# Patient Record
Sex: FEMALE | Race: WHITE | NOT HISPANIC OR LATINO | Employment: UNEMPLOYED | URBAN - METROPOLITAN AREA
[De-identification: names, ages, dates, MRNs, and addresses within clinical notes are randomized per-mention and may not be internally consistent; named-entity substitution may affect disease eponyms.]

---

## 2017-10-25 ENCOUNTER — TRANSCRIBE ORDERS (OUTPATIENT)
Dept: LAB | Facility: HOSPITAL | Age: 37
End: 2017-10-25

## 2017-10-25 ENCOUNTER — LAB (OUTPATIENT)
Dept: LAB | Facility: HOSPITAL | Age: 37
End: 2017-10-25

## 2017-10-25 DIAGNOSIS — N83.202 CYST OF LEFT OVARY: Primary | ICD-10-CM

## 2017-10-25 DIAGNOSIS — N83.202 CYST OF LEFT OVARY: ICD-10-CM

## 2017-10-25 DIAGNOSIS — N83.292 COMPLEX CYST OF LEFT OVARY: ICD-10-CM

## 2017-10-25 LAB
DEPRECATED RDW RBC AUTO: 38.3 FL (ref 37–54)
ERYTHROCYTE [DISTWIDTH] IN BLOOD BY AUTOMATED COUNT: 11.8 % (ref 11.3–14.5)
HCT VFR BLD AUTO: 41.1 % (ref 34.5–44)
HGB BLD-MCNC: 13.9 G/DL (ref 11.5–15.5)
MCH RBC QN AUTO: 30.2 PG (ref 27–31)
MCHC RBC AUTO-ENTMCNC: 33.8 G/DL (ref 32–36)
MCV RBC AUTO: 89.3 FL (ref 80–99)
PLATELET # BLD AUTO: 143 10*3/MM3 (ref 150–450)
PMV BLD AUTO: 10.7 FL (ref 6–12)
RBC # BLD AUTO: 4.6 10*6/MM3 (ref 3.89–5.14)
WBC NRBC COR # BLD: 4.44 10*3/MM3 (ref 3.5–10.8)

## 2017-10-25 PROCEDURE — 36415 COLL VENOUS BLD VENIPUNCTURE: CPT

## 2017-10-25 PROCEDURE — 85027 COMPLETE CBC AUTOMATED: CPT | Performed by: OBSTETRICS & GYNECOLOGY

## 2017-10-27 ENCOUNTER — LAB REQUISITION (OUTPATIENT)
Dept: LAB | Facility: HOSPITAL | Age: 37
End: 2017-10-27

## 2017-10-27 DIAGNOSIS — N83.292 OTHER OVARIAN CYST, LEFT SIDE: ICD-10-CM

## 2017-10-27 PROCEDURE — 88305 TISSUE EXAM BY PATHOLOGIST: CPT | Performed by: OBSTETRICS & GYNECOLOGY

## 2017-10-31 LAB
CYTO UR: NORMAL
LAB AP CASE REPORT: NORMAL
LAB AP CLINICAL INFORMATION: NORMAL
Lab: NORMAL
PATH REPORT.FINAL DX SPEC: NORMAL
PATH REPORT.GROSS SPEC: NORMAL

## 2018-01-25 ENCOUNTER — TRANSCRIBE ORDERS (OUTPATIENT)
Dept: ADMINISTRATIVE | Facility: HOSPITAL | Age: 38
End: 2018-01-25

## 2018-01-25 ENCOUNTER — HOSPITAL ENCOUNTER (OUTPATIENT)
Dept: CT IMAGING | Facility: HOSPITAL | Age: 38
Discharge: HOME | End: 2018-01-25
Admitting: NURSE PRACTITIONER

## 2018-01-25 DIAGNOSIS — R10.30 LOWER ABDOMINAL PAIN: ICD-10-CM

## 2018-01-25 DIAGNOSIS — R10.30 LOWER ABDOMINAL PAIN: Primary | ICD-10-CM

## 2018-01-25 PROCEDURE — 74176 CT ABD & PELVIS W/O CONTRAST: CPT

## 2018-04-17 ENCOUNTER — TRANSCRIBE ORDERS (OUTPATIENT)
Dept: ADMINISTRATIVE | Facility: HOSPITAL | Age: 38
End: 2018-04-17

## 2018-04-17 ENCOUNTER — HOSPITAL ENCOUNTER (OUTPATIENT)
Dept: CT IMAGING | Facility: HOSPITAL | Age: 38
Discharge: HOME | End: 2018-04-17
Attending: FAMILY MEDICINE | Admitting: FAMILY MEDICINE

## 2018-04-17 DIAGNOSIS — R10.31 ABDOMINAL PAIN, RIGHT LOWER QUADRANT: ICD-10-CM

## 2018-04-17 DIAGNOSIS — R10.31 ABDOMINAL PAIN, RIGHT LOWER QUADRANT: Primary | ICD-10-CM

## 2018-04-17 PROCEDURE — 74176 CT ABD & PELVIS W/O CONTRAST: CPT

## 2018-07-03 ENCOUNTER — TRANSCRIBE ORDERS (OUTPATIENT)
Dept: ADMINISTRATIVE | Facility: HOSPITAL | Age: 38
End: 2018-07-03

## 2018-07-03 DIAGNOSIS — I51.7 CARDIOMEGALY: Primary | ICD-10-CM

## 2018-07-05 ENCOUNTER — HOSPITAL ENCOUNTER (OUTPATIENT)
Dept: CARDIOLOGY | Facility: HOSPITAL | Age: 38
Discharge: HOME | End: 2018-07-05
Attending: FAMILY MEDICINE | Admitting: FAMILY MEDICINE

## 2018-07-05 VITALS
WEIGHT: 164 LBS | HEIGHT: 64 IN | BODY MASS INDEX: 28 KG/M2 | SYSTOLIC BLOOD PRESSURE: 107 MMHG | DIASTOLIC BLOOD PRESSURE: 69 MMHG

## 2018-07-05 DIAGNOSIS — I51.7 CARDIOMEGALY: ICD-10-CM

## 2018-07-05 LAB
BH CV ECHO MEAS - AO ROOT AREA (BSA CORRECTED): 1.6
BH CV ECHO MEAS - AO ROOT AREA: 6.6 CM^2
BH CV ECHO MEAS - AO ROOT DIAM: 2.9 CM
BH CV ECHO MEAS - BSA(HAYCOCK): 1.9 M^2
BH CV ECHO MEAS - BSA: 1.8 M^2
BH CV ECHO MEAS - BZI_BMI: 28.2 KILOGRAMS/M^2
BH CV ECHO MEAS - BZI_METRIC_HEIGHT: 162.6 CM
BH CV ECHO MEAS - BZI_METRIC_WEIGHT: 74.4 KG
BH CV ECHO MEAS - CONTRAST EF (2CH): 70.1 ML/M^2
BH CV ECHO MEAS - CONTRAST EF 4CH: 62.8 ML/M^2
BH CV ECHO MEAS - EDV(CUBED): 66.4 ML
BH CV ECHO MEAS - EDV(MOD-SP2): 137 ML
BH CV ECHO MEAS - EDV(MOD-SP4): 86 ML
BH CV ECHO MEAS - EDV(TEICH): 72.1 ML
BH CV ECHO MEAS - EF(CUBED): 69.7 %
BH CV ECHO MEAS - EF(MOD-BP): 67 %
BH CV ECHO MEAS - EF(MOD-SP2): 70.1 %
BH CV ECHO MEAS - EF(MOD-SP4): 62.8 %
BH CV ECHO MEAS - EF(TEICH): 61.8 %
BH CV ECHO MEAS - ESV(CUBED): 20.1 ML
BH CV ECHO MEAS - ESV(MOD-SP2): 41 ML
BH CV ECHO MEAS - ESV(MOD-SP4): 32 ML
BH CV ECHO MEAS - ESV(TEICH): 27.5 ML
BH CV ECHO MEAS - FS: 32.8 %
BH CV ECHO MEAS - IVS/LVPW: 0.95
BH CV ECHO MEAS - IVSD: 0.69 CM
BH CV ECHO MEAS - LA DIMENSION: 3 CM
BH CV ECHO MEAS - LA/AO: 1
BH CV ECHO MEAS - LAT PEAK E' VEL: 12.7 CM/SEC
BH CV ECHO MEAS - LV DIASTOLIC VOL/BSA (35-75): 47.8 ML/M^2
BH CV ECHO MEAS - LV IVRT: 0.09 SEC
BH CV ECHO MEAS - LV MASS(C)D: 80.8 GRAMS
BH CV ECHO MEAS - LV MASS(C)DI: 45 GRAMS/M^2
BH CV ECHO MEAS - LV SYSTOLIC VOL/BSA (12-30): 17.8 ML/M^2
BH CV ECHO MEAS - LVIDD: 4.1 CM
BH CV ECHO MEAS - LVIDS: 2.7 CM
BH CV ECHO MEAS - LVLD AP2: 7.8 CM
BH CV ECHO MEAS - LVLD AP4: 7.2 CM
BH CV ECHO MEAS - LVLS AP2: 5.9 CM
BH CV ECHO MEAS - LVLS AP4: 5.5 CM
BH CV ECHO MEAS - LVOT AREA (M): 3.1 CM^2
BH CV ECHO MEAS - LVOT AREA: 3.1 CM^2
BH CV ECHO MEAS - LVOT DIAM: 2 CM
BH CV ECHO MEAS - LVPWD: 0.73 CM
BH CV ECHO MEAS - MED PEAK E' VEL: 9.87 CM/SEC
BH CV ECHO MEAS - MV A MAX VEL: 60.5 CM/SEC
BH CV ECHO MEAS - MV DEC TIME: 0.19 SEC
BH CV ECHO MEAS - MV E MAX VEL: 80.8 CM/SEC
BH CV ECHO MEAS - MV E/A: 1.3
BH CV ECHO MEAS - PA ACC SLOPE: 766 CM/SEC^2
BH CV ECHO MEAS - PA ACC TIME: 0.15 SEC
BH CV ECHO MEAS - PA PR(ACCEL): 11.1 MMHG
BH CV ECHO MEAS - PULM A REVS VEL: 48.7 CM/SEC
BH CV ECHO MEAS - PULM DIAS VEL: 50 CM/SEC
BH CV ECHO MEAS - PULM S/D: 1.4
BH CV ECHO MEAS - PULM SYS VEL: 71.6 CM/SEC
BH CV ECHO MEAS - RAP SYSTOLE: 3 MMHG
BH CV ECHO MEAS - RVSP: 24 MMHG
BH CV ECHO MEAS - SI(CUBED): 25.8 ML/M^2
BH CV ECHO MEAS - SI(MOD-SP2): 53.4 ML/M^2
BH CV ECHO MEAS - SI(MOD-SP4): 30 ML/M^2
BH CV ECHO MEAS - SI(TEICH): 24.8 ML/M^2
BH CV ECHO MEAS - SV(CUBED): 46.3 ML
BH CV ECHO MEAS - SV(MOD-SP2): 96 ML
BH CV ECHO MEAS - SV(MOD-SP4): 54 ML
BH CV ECHO MEAS - SV(TEICH): 44.6 ML
BH CV ECHO MEAS - TAPSE (>1.6): 2.6 CM2
BH CV ECHO MEAS - TR MAX V: 21 MMHG
BH CV ECHO MEAS - TR MAX VEL: 227 CM/SEC
BH CV ECHO MEASUREMENTS AVERAGE E/E' RATIO: 7.16
BH CV VAS BP LEFT ARM: NORMAL MMHG
BH CV XLRA - RV BASE: 4 CM
BH CV XLRA - RV LENGTH: 6.8 CM
BH CV XLRA - RV MID: 3.3 CM
BH CV XLRA - TDI S': 16.2 CM/SEC
LEFT ATRIUM VOLUME INDEX: 22.8 ML/M2
LV EF 2D ECHO EST: 65 %

## 2018-07-05 PROCEDURE — 93306 TTE W/DOPPLER COMPLETE: CPT | Performed by: INTERNAL MEDICINE

## 2018-07-05 PROCEDURE — 93306 TTE W/DOPPLER COMPLETE: CPT

## 2018-07-17 NOTE — PROGRESS NOTES
Subjective:     Encounter Date:07/20/2018    Patient ID: Odalys Alaniz is a 37 y.o.  white female from [place], [place], stay at home mom.    PHYSICIAN: Leigha Lopes MD    Chief Complaint:   Chief Complaint   Patient presents with   • Chest Pain   • Irregular Heart Beat   • Shortness of Breath     Problem List:  1. Palpitations with associated chest discomfort/shortness of breath  A. CTA chest 7/2/18: Negative for PE, no acute process  B. Echocardiogram 7/5/18: LVEF 0.65, trace MR, trace TR  C. CCS class I chest discomfort/NYHA class II dyspnea symptoms with acceptable EKG.   2. History of syncope  3. History of migraines  4. Surgical history:  A. [place] teeth extraction  B. Ovarian cyst removal 2010, 2017    No Known Allergies    Current Outpatient Prescriptions:   •  levonorgestrel (MIRENA) 20 MCG/24HR IUD, 1 each by Intrauterine route 1 (One) Time., Disp: , Rfl:     History of Present Illness  The patient is a 37-year-old white female who presents to establish care for chest pain, shortness of breath, palpitations, and 5 previous syncopal episodes.  She states that she was very active in the past by doing high-intensity training at the gym several times a week, but feels that she cannot do these activities anymore.  She states that she gets very winded going up even a flight of stairs. She had bronchitis last month followed by some antibiotics ×2.  Then, she underwent a CTA of the chest, which was negative for active disease.  She has also had an echocardiogram, which was acceptable.  She has never had any prior stress test, heart catheterizations, MIs, diabetes mellitus, hypertension, hyperlipidemia, thyroid dysfunction, cardiomegaly, murmurs, arrhythmias, PAF, orthopnea, PND, COPD, asthma, anemia, GERD, melena, valvular dysfunction, CVAs, TIAs, seizures, DVTs, PEs, edema, heart failure, or claudication.  She states that she can feel these episodes coming on, and her chest discomfort feels like  someone pushing on her chest, with shortness of breath, radiation occasionally to her neck, and dizziness, presyncope.  She has had 5 total syncopal episodes always occurring after her chest pain and palpitations developed as a prodrome and not associated with any significant injury or prolonged unconsciousness, urinary/fecal incontinence, or seizure activity. She has never worn a heart monitor in the past.  Rest does not necessarily make these symptoms go away, and she states that these sensations can last all day at times.  She is scheduled to go to [place] 8/1/18-8/13/18 and is a little anxious that something will happen with her heart while she is there. She denies any prior PIH, preeclampsia, or gestational diabetes in her only pregnancy. She denies having ever smoked and she denies illicit drugs or significant alcohol use. Patient otherwise denies chest pain, shortness of breath, PND, edema, palpitations, syncope or presyncope at this time.    Cardiovascular Disease Risk Factors  none    Social History     Social History   • Marital status:      Spouse name: N/A   • Number of children: 1   • Years of education: N/A     Occupational History   • Not on file.     Social History Main Topics   • Smoking status: Not on file   • Smokeless tobacco: Not on file   • Alcohol use Not on file   • Drug use: Unknown   • Sexual activity: Not on file     Other Topics Concern   • Not on file     Social History Narrative   • No narrative on file   · Mother and father: Hypertension  · No siblings  · Daughter: Healthy    Review of Systems   Constitution: Positive for malaise/fatigue.   Eyes: Negative.    Cardiovascular: Positive for chest pain, near-syncope, palpitations and syncope. Negative for claudication, dyspnea on exertion, irregular heartbeat, leg swelling, orthopnea and paroxysmal nocturnal dyspnea.   Respiratory: Positive for shortness of breath.    Endocrine: Negative.    Hematologic/Lymphatic: Negative.    Skin:  "Negative.    Musculoskeletal: Positive for neck pain and stiffness.   Gastrointestinal: Negative for change in bowel habit, heartburn and melena.   Genitourinary: Positive for frequency.   Neurological: Positive for excessive daytime sleepiness and headaches. Negative for focal weakness and seizures.   Allergic/Immunologic: Positive for environmental allergies.      Obtained and negative except as outlined in problem list and HPI.      ECG 12 Lead  Date/Time: 7/20/2018 12:12 PM  Performed by: TESSA MILAN  Authorized by: TESSA MILAN   Rhythm comments: Normal sinus rhythm with sinus arrhythmia, right atrial enlargement, cannot rule out anterior infarct, age undetermined, abnormal ECG, 88 bpm, QRS 96 ms,  ms,  ms                 Objective:       Vitals:    07/20/18 1041 07/20/18 1042 07/20/18 1043 07/20/18 1047   BP: 98/62 104/58 110/60 104/58   BP Location: Right arm Right arm Left arm Left arm   Patient Position: Sitting Standing Sitting Standing   Pulse: 88 94 88 92   Weight: 71.1 kg (156 lb 12.8 oz)      Height: 162.6 cm (64\")        Body mass index is 26.91 kg/m².     Physical Exam   Constitutional: She is oriented to person, place, and time. She appears well-developed and well-nourished.   HENT:   Mouth/Throat: Mucous membranes are normal.   Eyes:   Fundoscopic exam:       The right eye shows no AV nicking, no exudate and no hemorrhage.        The left eye shows no AV nicking, no exudate and no hemorrhage.   Neck: No JVD present. Carotid bruit is not present. No thyromegaly present.   Cardiovascular: Regular rhythm, S1 normal and S2 normal.  Exam reveals no gallop, no S3 and no friction rub.    Murmur heard.   Medium-pitched systolic murmur is present with a grade of 1/6  at the lower left sternal border  Pulses:       Dorsalis pedis pulses are 2+ on the right side, and 2+ on the left side.        Posterior tibial pulses are 2+ on the right side, and 2+ on the left side.   Pulmonary/Chest: " Effort normal and breath sounds normal. She has no wheezes. She has no rhonchi. She has no rales.   Abdominal: Soft. She exhibits no mass. There is no hepatosplenomegaly. There is no tenderness. There is no guarding.   Bowel sounds audible x4   Musculoskeletal: Normal range of motion. She exhibits no edema.   Lymphadenopathy:     She has no cervical adenopathy.   Neurological: She is alert and oriented to person, place, and time.   Skin: Skin is warm, dry and intact. No rash noted.   Psychiatric: Her mood appears anxious.   Vitals reviewed.    Lab Review:   Results for orders placed or performed during the hospital encounter of 07/05/18   Adult Transthoracic Echo Complete W/ Cont if Necessary Per Protocol   Result Value Ref Range    BSA 1.8 m^2    IVSd 0.69 cm    LVIDd 4.1 cm    LVIDs 2.7 cm    LVPWd 0.73 cm    IVS/LVPW 0.95     FS 32.8 %    EDV(Teich) 72.1 ml    ESV(Teich) 27.5 ml    EF(Teich) 61.8 %    EDV(cubed) 66.4 ml    ESV(cubed) 20.1 ml    EF(cubed) 69.7 %    LV mass(C)d 80.8 grams    LV mass(C)dI 45.0 grams/m^2    SV(Teich) 44.6 ml    SI(Teich) 24.8 ml/m^2    SV(cubed) 46.3 ml    SI(cubed) 25.8 ml/m^2    Ao root diam 2.9 cm    Ao root area 6.6 cm^2    LA dimension 3.0 cm    LA/Ao 1.0     LVOT diam 2.0 cm    LVOT area 3.1 cm^2    LVOT area(traced) 3.1 cm^2    LVLd ap4 7.2 cm    EDV(MOD-sp4) 86.0 ml    LVLs ap4 5.5 cm    ESV(MOD-sp4) 32.0 ml    EF(MOD-sp4) 62.8 %    LVLd ap2 7.8 cm    EDV(MOD-sp2) 137.0 ml    LVLs ap2 5.9 cm    ESV(MOD-sp2) 41.0 ml    EF(MOD-sp2) 70.1 %    SV(MOD-sp4) 54.0 ml    SI(MOD-sp4) 30.0 ml/m^2    SV(MOD-sp2) 96.0 ml    SI(MOD-sp2) 53.4 ml/m^2    Ao root area (BSA corrected) 1.6     Ao root area (BSA corrected) 70.1 ml/m^2    CONTRAST EF 4CH 62.8 ml/m^2    LV Diastolic corrected for BSA 47.8 ml/m^2    LV Systolic corrected for BSA 17.8 ml/m^2    MV E max gorge 80.8 cm/sec    MV A max gorge 60.5 cm/sec    MV E/A 1.3     LV IVRT 0.09 sec    MV dec time 0.19 sec    PA acc slope 766.0  cm/sec^2    PA acc time 0.15 sec    TR max joselito 227 cm/sec    PA pr(Accel) 11.1 mmHg    Pulm Sys Joselito 71.6 cm/sec    Pulm Ray Joselito 50.0 cm/sec    Pulm S/D 1.4     Pulm A Revs Joselito 48.7 cm/sec     CV ECHO KING - BZI_BMI 28.2 kilograms/m^2     CV ECHO KING - BSA(HAYCOCK) 1.9 m^2     CV ECHO KING - BZI_METRIC_WEIGHT 74.4 kg     CV ECHO KING - BZI_METRIC_HEIGHT 162.6 cm     CV VAS BP LEFT /72 mmHg    TDI S' 16.20 cm/sec    RV Base 4.00 cm    RV Length 6.80 cm    RV Mid 3.30 cm    LA Volume Index 22.8 mL/m2    Echo EF Estimated 65 %    Avg E/e' ratio 7.16     EF(MOD-bp) 67 %    Lat Peak E' Joselito 12.7 cm/sec    Med Peak E' Joselito 9.87 cm/sec    RAP systole 3 mmHg    RVSP(TR) 24 mmHg    TR max grad 21 mmHg    TAPSE (>1.6) 2.60 cm2     (07/02/2018):   · CK-MB 1  · Troponin 0.05   · Myoglobin 36    CBC (07/02/2018):   · WBC 8.6  · RBC 5.06  · Hemoglobin 16.3  · Hematocrit 46.1  · MCV 91  · MCH 32.2  · MCHC 35.4  · RDW 12  · Platelets 159  · MPV 10.4  · Neutrophils 58.3  · Lymphocytes 35.1  · Mixed 6.6    · CTA chest (07/02/2018): Negative exam for PE, no acute process    · ECG (07/02/2018): Sinus rhythm, RSR (V1) nondiagnostic, combined atrial enlargement, 86 bpm,  ms,  ms, QRS 90 ms    Assessment:   Patient with chest discomfort and SOB with palpitations and past history of syncope. Both her echocardiogram and CTA of the chest were acceptable. We will order a tilt table test to rule out neurocardiogenic syncope. We will order a treadmill stress test with continuous oximetry to assess her chest discomfort and SOB. We will place a MCOT to assess her palpitations to rule out arrhythmias, PAF, or pauses. We will order a TSH to assess for thyroid dysfunction and get a BNP since we do not have any values at baseline.      Diagnosis Plan   1. Palpitations  TSH    [facility] Out[facility] Telemetry    Tilt Table    Treadmill Stress Test    ECG 12 Lead   2. Syncope, unspecified syncope type  TSH    Mobile  Cardiac Outpatient Telemetry    Tilt Table    Treadmill Stress Test   3. Chest pain, unspecified type  TSH    BNP    Treadmill Stress Test    ECG 12 Lead     Plan:     1. Patient to continue current medications and close follow up with the above providers.  2. Tentative cardiology follow up in 4 weeks or patient may return sooner PRN.  3. Schedule and undergo tilt table test.  4. Schedule and undergo GXT stress test with continuous oximetry.   5. TSH and BNP ordered.  6. MCOT.   7. 1(800)card issued.     Scribed for Butch Canales MD by Fabiola Reyna, JG. 7/20/2018  12:55 PM     I, Butch Canales MD, [facility], personally performed the services described in this documentation as scribed by the above named individual in my presence, and it is both accurate and complete. At 1:02 PM on 07/20/2018

## 2018-07-20 ENCOUNTER — LAB (OUTPATIENT)
Dept: LAB | Facility: HOSPITAL | Age: 38
End: 2018-07-20

## 2018-07-20 ENCOUNTER — CONSULT (OUTPATIENT)
Dept: CARDIOLOGY | Facility: CLINIC | Age: 38
End: 2018-07-20

## 2018-07-20 VITALS
BODY MASS INDEX: 26.77 KG/M2 | DIASTOLIC BLOOD PRESSURE: 58 MMHG | HEIGHT: 64 IN | HEART RATE: 92 BPM | WEIGHT: 156.8 LBS | SYSTOLIC BLOOD PRESSURE: 104 MMHG

## 2018-07-20 DIAGNOSIS — R07.9 CHEST PAIN, UNSPECIFIED TYPE: ICD-10-CM

## 2018-07-20 DIAGNOSIS — R55 SYNCOPE, UNSPECIFIED SYNCOPE TYPE: ICD-10-CM

## 2018-07-20 DIAGNOSIS — R00.2 PALPITATIONS: ICD-10-CM

## 2018-07-20 DIAGNOSIS — R06.02 SOB (SHORTNESS OF BREATH): Primary | ICD-10-CM

## 2018-07-20 DIAGNOSIS — R00.2 PALPITATIONS: Primary | ICD-10-CM

## 2018-07-20 LAB
ANION GAP SERPL CALCULATED.3IONS-SCNC: 5 MMOL/L (ref 3–11)
BUN BLD-MCNC: 15 MG/DL (ref 9–23)
BUN/CREAT SERPL: 21.1 (ref 7–25)
CALCIUM SPEC-SCNC: 9 MG/DL (ref 8.7–10.4)
CHLORIDE SERPL-SCNC: 105 MMOL/L (ref 99–109)
CO2 SERPL-SCNC: 28 MMOL/L (ref 20–31)
CREAT BLD-MCNC: 0.71 MG/DL (ref 0.6–1.3)
GFR SERPL CREATININE-BSD FRML MDRD: 94 ML/MIN/1.73
GLUCOSE BLD-MCNC: 103 MG/DL (ref 70–100)
POTASSIUM BLD-SCNC: 4 MMOL/L (ref 3.5–5.5)
SODIUM BLD-SCNC: 138 MMOL/L (ref 132–146)
TSH SERPL DL<=0.05 MIU/L-ACNC: 2.41 MIU/ML (ref 0.35–5.35)

## 2018-07-20 PROCEDURE — 93000 ELECTROCARDIOGRAM COMPLETE: CPT | Performed by: INTERNAL MEDICINE

## 2018-07-20 PROCEDURE — 84443 ASSAY THYROID STIM HORMONE: CPT

## 2018-07-20 PROCEDURE — 80048 BASIC METABOLIC PNL TOTAL CA: CPT

## 2018-07-20 PROCEDURE — 36415 COLL VENOUS BLD VENIPUNCTURE: CPT

## 2018-07-20 PROCEDURE — 99204 OFFICE O/P NEW MOD 45 MIN: CPT | Performed by: INTERNAL MEDICINE

## 2018-07-25 ENCOUNTER — RESULTS ENCOUNTER (OUTPATIENT)
Dept: CARDIOLOGY | Facility: CLINIC | Age: 38
End: 2018-07-25

## 2018-07-25 DIAGNOSIS — R06.02 SOB (SHORTNESS OF BREATH): ICD-10-CM

## 2018-07-30 ENCOUNTER — HOSPITAL ENCOUNTER (OUTPATIENT)
Dept: CARDIOLOGY | Facility: HOSPITAL | Age: 38
Discharge: HOME | End: 2018-07-30
Attending: INTERNAL MEDICINE | Admitting: INTERNAL MEDICINE

## 2018-07-30 VITALS
BODY MASS INDEX: 26.46 KG/M2 | SYSTOLIC BLOOD PRESSURE: 100 MMHG | HEIGHT: 64 IN | HEART RATE: 75 BPM | WEIGHT: 155 LBS | DIASTOLIC BLOOD PRESSURE: 68 MMHG

## 2018-07-30 DIAGNOSIS — R07.9 CHEST PAIN, UNSPECIFIED TYPE: ICD-10-CM

## 2018-07-30 DIAGNOSIS — R00.2 PALPITATIONS: ICD-10-CM

## 2018-07-30 DIAGNOSIS — R55 SYNCOPE, UNSPECIFIED SYNCOPE TYPE: ICD-10-CM

## 2018-07-30 LAB
BH CV STRESS BP STAGE 1: NORMAL
BH CV STRESS BP STAGE 2: NORMAL
BH CV STRESS BP STAGE 3: NORMAL
BH CV STRESS DURATION MIN STAGE 1: 3
BH CV STRESS DURATION MIN STAGE 2: 3
BH CV STRESS DURATION MIN STAGE 3: 3
BH CV STRESS DURATION MIN STAGE 4: 2
BH CV STRESS DURATION SEC STAGE 1: 0
BH CV STRESS DURATION SEC STAGE 2: 0
BH CV STRESS DURATION SEC STAGE 3: 0
BH CV STRESS DURATION SEC STAGE 4: 1
BH CV STRESS GRADE STAGE 1: 10
BH CV STRESS GRADE STAGE 2: 12
BH CV STRESS GRADE STAGE 3: 14
BH CV STRESS GRADE STAGE 4: 16
BH CV STRESS HR STAGE 1: 111
BH CV STRESS HR STAGE 2: 118
BH CV STRESS HR STAGE 3: 144
BH CV STRESS HR STAGE 4: 179
BH CV STRESS METS STAGE 1: 5
BH CV STRESS METS STAGE 2: 7.5
BH CV STRESS METS STAGE 3: 10
BH CV STRESS METS STAGE 4: 13.5
BH CV STRESS O2 STAGE 1: 100
BH CV STRESS O2 STAGE 2: 100
BH CV STRESS O2 STAGE 3: 99
BH CV STRESS O2 STAGE 4: 98
BH CV STRESS PROTOCOL 1: NORMAL
BH CV STRESS RECOVERY BP: NORMAL MMHG
BH CV STRESS RECOVERY HR: 99 BPM
BH CV STRESS SPEED STAGE 1: 1.7
BH CV STRESS SPEED STAGE 2: 2.5
BH CV STRESS SPEED STAGE 3: 3.4
BH CV STRESS SPEED STAGE 4: 4.2
BH CV STRESS STAGE 1: 1
BH CV STRESS STAGE 2: 2
BH CV STRESS STAGE 3: 3
BH CV STRESS STAGE 4: 4
MAXIMAL PREDICTED HEART RATE: 185 BPM
PERCENT MAX PREDICTED HR: 96.76 %
STRESS BASELINE BP: NORMAL MMHG
STRESS BASELINE HR: 78 BPM
STRESS O2 SAT REST: 100 %
STRESS PERCENT HR: 114 %
STRESS POST ESTIMATED WORKLOAD: 13.4 METS
STRESS POST EXERCISE DUR MIN: 11 MIN
STRESS POST EXERCISE DUR SEC: 1 SEC
STRESS POST O2 SAT PEAK: 98 %
STRESS POST PEAK BP: NORMAL MMHG
STRESS POST PEAK HR: 179 BPM
STRESS TARGET HR: 157 BPM

## 2018-07-30 PROCEDURE — 93018 CV STRESS TEST I&R ONLY: CPT | Performed by: INTERNAL MEDICINE

## 2018-07-30 PROCEDURE — 93017 CV STRESS TEST TRACING ONLY: CPT

## 2018-08-22 ENCOUNTER — APPOINTMENT (OUTPATIENT)
Dept: CARDIOLOGY | Facility: HOSPITAL | Age: 38
End: 2018-08-22
Attending: INTERNAL MEDICINE

## 2018-08-27 ENCOUNTER — OFFICE VISIT (OUTPATIENT)
Dept: CARDIOLOGY | Facility: CLINIC | Age: 38
End: 2018-08-27

## 2018-08-27 VITALS
WEIGHT: 155 LBS | HEART RATE: 86 BPM | DIASTOLIC BLOOD PRESSURE: 62 MMHG | BODY MASS INDEX: 26.46 KG/M2 | HEIGHT: 64 IN | SYSTOLIC BLOOD PRESSURE: 98 MMHG

## 2018-08-27 DIAGNOSIS — R55 SYNCOPE, UNSPECIFIED SYNCOPE TYPE: ICD-10-CM

## 2018-08-27 DIAGNOSIS — R00.2 PALPITATIONS: Primary | ICD-10-CM

## 2018-08-27 DIAGNOSIS — R07.2 PRECORDIAL PAIN: ICD-10-CM

## 2018-08-27 PROCEDURE — 99214 OFFICE O/P EST MOD 30 MIN: CPT | Performed by: INTERNAL MEDICINE

## 2018-08-27 NOTE — PROGRESS NOTES
Subjective:     Encounter Date:08/27/2018    Patient ID: Odalys Alaniz is a 38 y.o.  white female from [place], [place], stay at home mom.     PHYSICIAN: Leigha Lopes MD    Chief Complaint:   Chief Complaint   Patient presents with   • Palpitations     Problem List:  1. Palpitations with associated chest discomfort/shortness of breath  A. CTA chest 7/2/18: Negative for PE, no acute process  B. Echocardiogram 7/5/18: LVEF 0.65, trace MR, trace TR  C. CCS class I chest discomfort/NYHA class II dyspnea symptoms with acceptable EKG with acceptable probably negative exercise stress test with borderline ischemic EKG changes and nonlimiting chest discomfort suggestive of low probability for significant focal obstructive coronary artery disease following exercise to 111% predicted excess capacity and 97% predicted maximum heart rate, 07/30/2018  D. Residual class I symptoms with event recorder pending, August 2018  2. History of syncope  3. History of migraines  4. Surgical history:  A. [place] teeth extraction  B. Ovarian cyst removal 2010, 2017    No Known Allergies      Current Outpatient Prescriptions:   •  levonorgestrel (MIRENA) 20 MCG/24HR IUD, 1 each by Intrauterine route 1 (One) Time., Disp: , Rfl:     HISTORY OF PRESENT ILLNESS: Patient returns for scheduled 1-month followup.  She was to undergo a tilt table test in the interim; however, this test has not been performed.  She notes this was scheduled, but her  was going to be traveling during that time, so she canceled it.  She is advised today that her stress test looked good and that there should be no limitations with her activity.  She has worn a CardioNet monitor and turned it in approximately a week ago; however, we have not received those results.  The patient will be sent a letter with those results when available.  She plans to work out with a  for 60 minutes, 3 times a week, and she is advised that she should be fine with  "that activity.  She says that she wore the CardioNet \"pretty much the whole time,\" except for while she was in [place] on vacation since she would be getting in the ocean.  She says that she tried paddleboarding for the first time while there; they stayed on [place] property there in the [facility] area.  She notes that she actually started feeling better while she was on vacation.  Patient otherwise denies chest pain, shortness of breath, PND, edema, palpitations, syncope or presyncope at this time.        Review of Systems   Hematologic/Lymphatic: Positive for adenopathy.   Musculoskeletal: Positive for neck pain and stiffness.   Genitourinary: Positive for bladder incontinence and frequency.   Neurological: Positive for numbness.   Allergic/Immunologic: Positive for environmental allergies.      Obtained and otherwise negative except as outlined in problem list and HPI.    Procedures       Objective:       Vitals:    08/27/18 1007 08/27/18 1011   BP: 92/60 98/62   BP Location: Left arm Left arm   Patient Position: Sitting Standing   Pulse: 82 86   Weight: 70.3 kg (155 lb)    Height: 162.6 cm (64\")      Body mass index is 26.61 kg/m².   Last weight:  156 lbs.    Physical Exam   Constitutional: She is oriented to person, place, and time. She appears well-developed and well-nourished.   Neck: No JVD present. Carotid bruit is not present. No thyromegaly present.   Cardiovascular: Regular rhythm, S1 normal, S2 normal and normal heart sounds.  Exam reveals no gallop, no S3 and no friction rub.    No murmur heard.  Pulses:       Dorsalis pedis pulses are 2+ on the right side, and 2+ on the left side.        Posterior tibial pulses are 2+ on the right side, and 2+ on the left side.   Pulmonary/Chest: Effort normal and breath sounds normal. She has no wheezes. She has no rhonchi. She has no rales.   Abdominal: Soft. She exhibits no mass. There is no hepatosplenomegaly. There is no tenderness. There is no guarding.   Bowel sounds " audible x4   Musculoskeletal: Normal range of motion. She exhibits no edema.   Lymphadenopathy:     She has no cervical adenopathy.   Neurological: She is alert and oriented to person, place, and time.   Skin: Skin is warm, dry and intact. No rash noted.   Vitals reviewed.        Lab Review:   Lab Results   Component Value Date    GLUCOSE 103 (H) 07/20/2018    BUN 15 07/20/2018    CREATININE 0.71 07/20/2018    EGFRIFNONA 94 07/20/2018    BCR 21.1 07/20/2018    CO2 28.0 07/20/2018    CALCIUM 9.0 07/20/2018       Lab Results   Component Value Date    WBC 4.44 10/25/2017    HGB 13.9 10/25/2017    HCT 41.1 10/25/2017    MCV 89.3 10/25/2017     (L) 10/25/2017       Lab Results   Component Value Date    TSH 2.410 07/20/2018     Stress test, 07/30/2018:    · Patient noted chest pressure at peak exercise; this resolved 4 minutes into recovery.  · Expected exercise duration = 9:40 minutes; actual = 11:01 minutes; BOGDAN = ( - 11).  · THR of 157 bpm achieved at 10:00 minutes; normal rest and exercise room air oximetry readings ( percent).  · The patient reported shortness of breath and peak exercise anginal type chest discomfort during the stress test.  · No significant ST or T wave changes noted; no ectopy.  · Acceptable probably negative exercise stress test with borderline ischemic EKG changes and nonlimiting chest discomfort suggestive of low probability for significant focal obstructive coronary artery disease following exercise to 111% predicted excess capacity and 97% predicted maximum heart rate.      Assessment:   Overall continued acceptable course with no interim cardiopulmonary complaints with good functional status. We will defer additional diagnostic or therapeutic intervention from a cardiac perspective at this time.  We will review the results of her CardioNet monitor and forward the patient a letter with those results.  We will defer tilt table testing at this time since the patient is asymptomatic.   I do feel she can exercise without restriction and do weight training under the supervision of her .       Diagnosis Plan   1. Palpitations  Will review event recorder and final report when available   2. Syncope, unspecified syncope type  No recurrence; defer tilt table test at this time   3. Precordial pain  Acceptable exercise stress test; would pursue continued physical activity and training as tolerated          Plan:         1. Patient to continue current medications and close follow up with the above providers.  2. Tentative cardiology follow up in February 2019, or patient may return sooner PRN.  3. We will write a release for her to initiate exercise training under the direction of her .    Transcribed by Priya Valles for Dr. Butch Canales at 10:19 AM on 08/27/2018    I, Butch Canales MD, [facility], personally performed the services described in this documentation as scribed by the above named individual in my presence, and it is both accurate and complete. At 10:21 AM on 08/27/2018

## 2019-03-01 ENCOUNTER — OFFICE VISIT (OUTPATIENT)
Dept: CARDIOLOGY | Facility: CLINIC | Age: 39
End: 2019-03-01

## 2019-03-01 VITALS
BODY MASS INDEX: 28.34 KG/M2 | WEIGHT: 166 LBS | HEIGHT: 64 IN | DIASTOLIC BLOOD PRESSURE: 80 MMHG | SYSTOLIC BLOOD PRESSURE: 104 MMHG | HEART RATE: 90 BPM | OXYGEN SATURATION: 99 %

## 2019-03-01 DIAGNOSIS — R00.2 PALPITATIONS: Primary | ICD-10-CM

## 2019-03-01 DIAGNOSIS — R55 SYNCOPE, UNSPECIFIED SYNCOPE TYPE: ICD-10-CM

## 2019-03-01 DIAGNOSIS — R07.2 PRECORDIAL PAIN: ICD-10-CM

## 2019-03-01 PROCEDURE — 99213 OFFICE O/P EST LOW 20 MIN: CPT | Performed by: NURSE PRACTITIONER

## 2021-01-29 ENCOUNTER — TRANSCRIBE ORDERS (OUTPATIENT)
Dept: ADMINISTRATIVE | Facility: HOSPITAL | Age: 41
End: 2021-01-29

## 2021-01-29 ENCOUNTER — HOSPITAL ENCOUNTER (OUTPATIENT)
Dept: GENERAL RADIOLOGY | Facility: HOSPITAL | Age: 41
Discharge: HOME | End: 2021-01-29
Admitting: INTERNAL MEDICINE

## 2021-01-29 DIAGNOSIS — M25.521 RIGHT ELBOW PAIN: Primary | ICD-10-CM

## 2021-01-29 PROCEDURE — 72050 X-RAY EXAM NECK SPINE 4/5VWS: CPT

## 2021-01-29 PROCEDURE — 73070 X-RAY EXAM OF ELBOW: CPT

## 2021-02-01 ENCOUNTER — TRANSCRIBE ORDERS (OUTPATIENT)
Dept: ADMINISTRATIVE | Facility: HOSPITAL | Age: 41
End: 2021-02-01

## 2021-02-01 DIAGNOSIS — G56.21 ULNAR NEUROPATHY AT ELBOW, RIGHT: Primary | ICD-10-CM

## 2021-03-02 ENCOUNTER — APPOINTMENT (OUTPATIENT)
Dept: NEUROLOGY | Facility: HOSPITAL | Age: 41
End: 2021-03-02

## 2021-08-30 ENCOUNTER — TRANSCRIBE ORDERS (OUTPATIENT)
Dept: ADMINISTRATIVE | Facility: HOSPITAL | Age: 41
End: 2021-08-30

## 2021-08-30 DIAGNOSIS — G43.909 MIGRAINE WITHOUT STATUS MIGRAINOSUS, NOT INTRACTABLE, UNSPECIFIED MIGRAINE TYPE: Primary | ICD-10-CM

## 2021-09-07 ENCOUNTER — TRANSCRIBE ORDERS (OUTPATIENT)
Dept: ADMINISTRATIVE | Facility: HOSPITAL | Age: 41
End: 2021-09-07

## 2021-09-17 ENCOUNTER — OFFICE VISIT (OUTPATIENT)
Dept: NEUROLOGY | Facility: CLINIC | Age: 41
End: 2021-09-17

## 2021-09-17 ENCOUNTER — LAB (OUTPATIENT)
Dept: LAB | Facility: HOSPITAL | Age: 41
End: 2021-09-17

## 2021-09-17 VITALS
SYSTOLIC BLOOD PRESSURE: 118 MMHG | BODY MASS INDEX: 30.83 KG/M2 | HEIGHT: 63 IN | HEART RATE: 99 BPM | WEIGHT: 174 LBS | OXYGEN SATURATION: 90 % | DIASTOLIC BLOOD PRESSURE: 64 MMHG

## 2021-09-17 DIAGNOSIS — M54.2 CERVICALGIA: ICD-10-CM

## 2021-09-17 DIAGNOSIS — G43.119 INTRACTABLE MIGRAINE WITH AURA WITHOUT STATUS MIGRAINOSUS: ICD-10-CM

## 2021-09-17 DIAGNOSIS — R20.2 PARESTHESIA: Primary | ICD-10-CM

## 2021-09-17 DIAGNOSIS — R29.898 LEFT LEG WEAKNESS: ICD-10-CM

## 2021-09-17 DIAGNOSIS — R29.898 RIGHT ARM WEAKNESS: ICD-10-CM

## 2021-09-17 DIAGNOSIS — R20.2 PARESTHESIA: ICD-10-CM

## 2021-09-17 LAB
25(OH)D3 SERPL-MCNC: 29.4 NG/ML
BASOPHILS # BLD AUTO: 0.01 10*3/MM3 (ref 0–0.2)
BASOPHILS NFR BLD AUTO: 0.2 % (ref 0–1.5)
DEPRECATED RDW RBC AUTO: 39.2 FL (ref 37–54)
EOSINOPHIL # BLD AUTO: 0.07 10*3/MM3 (ref 0–0.4)
EOSINOPHIL NFR BLD AUTO: 1.5 % (ref 0.3–6.2)
ERYTHROCYTE [DISTWIDTH] IN BLOOD BY AUTOMATED COUNT: 11.8 % (ref 12.3–15.4)
ERYTHROCYTE [SEDIMENTATION RATE] IN BLOOD: 4 MM/HR (ref 0–20)
FOLATE SERPL-MCNC: 7.02 NG/ML (ref 4.78–24.2)
HCT VFR BLD AUTO: 45.6 % (ref 34–46.6)
HGB BLD-MCNC: 15.5 G/DL (ref 12–15.9)
IMM GRANULOCYTES # BLD AUTO: 0.01 10*3/MM3 (ref 0–0.05)
IMM GRANULOCYTES NFR BLD AUTO: 0.2 % (ref 0–0.5)
LYMPHOCYTES # BLD AUTO: 1.52 10*3/MM3 (ref 0.7–3.1)
LYMPHOCYTES NFR BLD AUTO: 32.9 % (ref 19.6–45.3)
MCH RBC QN AUTO: 31.3 PG (ref 26.6–33)
MCHC RBC AUTO-ENTMCNC: 34 G/DL (ref 31.5–35.7)
MCV RBC AUTO: 92.1 FL (ref 79–97)
MONOCYTES # BLD AUTO: 0.43 10*3/MM3 (ref 0.1–0.9)
MONOCYTES NFR BLD AUTO: 9.3 % (ref 5–12)
NEUTROPHILS NFR BLD AUTO: 2.58 10*3/MM3 (ref 1.7–7)
NEUTROPHILS NFR BLD AUTO: 55.9 % (ref 42.7–76)
NRBC BLD AUTO-RTO: 0 /100 WBC (ref 0–0.2)
PLATELET # BLD AUTO: 159 10*3/MM3 (ref 140–450)
PMV BLD AUTO: 11.3 FL (ref 6–12)
RBC # BLD AUTO: 4.95 10*6/MM3 (ref 3.77–5.28)
VIT B12 BLD-MCNC: 234 PG/ML (ref 211–946)
WBC # BLD AUTO: 4.62 10*3/MM3 (ref 3.4–10.8)

## 2021-09-17 PROCEDURE — 86334 IMMUNOFIX E-PHORESIS SERUM: CPT

## 2021-09-17 PROCEDURE — 82784 ASSAY IGA/IGD/IGG/IGM EACH: CPT

## 2021-09-17 PROCEDURE — 84439 ASSAY OF FREE THYROXINE: CPT

## 2021-09-17 PROCEDURE — 85025 COMPLETE CBC W/AUTO DIFF WBC: CPT

## 2021-09-17 PROCEDURE — 82550 ASSAY OF CK (CPK): CPT

## 2021-09-17 PROCEDURE — 82607 VITAMIN B-12: CPT

## 2021-09-17 PROCEDURE — 36415 COLL VENOUS BLD VENIPUNCTURE: CPT

## 2021-09-17 PROCEDURE — 84443 ASSAY THYROID STIM HORMONE: CPT

## 2021-09-17 PROCEDURE — 80053 COMPREHEN METABOLIC PANEL: CPT

## 2021-09-17 PROCEDURE — 84165 PROTEIN E-PHORESIS SERUM: CPT

## 2021-09-17 PROCEDURE — 72156 MRI NECK SPINE W/O & W/DYE: CPT | Performed by: NURSE PRACTITIONER

## 2021-09-17 PROCEDURE — 83921 ORGANIC ACID SINGLE QUANT: CPT

## 2021-09-17 PROCEDURE — 82746 ASSAY OF FOLIC ACID SERUM: CPT

## 2021-09-17 PROCEDURE — 99215 OFFICE O/P EST HI 40 MIN: CPT | Performed by: NURSE PRACTITIONER

## 2021-09-17 PROCEDURE — 86038 ANTINUCLEAR ANTIBODIES: CPT

## 2021-09-17 PROCEDURE — 85652 RBC SED RATE AUTOMATED: CPT

## 2021-09-17 PROCEDURE — 86140 C-REACTIVE PROTEIN: CPT

## 2021-09-17 PROCEDURE — 82306 VITAMIN D 25 HYDROXY: CPT

## 2021-09-17 RX ORDER — PREDNISONE 10 MG/1
TABLET ORAL
Qty: 42 TABLET | Refills: 0 | Status: SHIPPED | OUTPATIENT
Start: 2021-09-17 | End: 2021-12-06

## 2021-09-17 RX ORDER — CYCLOBENZAPRINE HCL 10 MG
10 TABLET ORAL 3 TIMES DAILY PRN
COMMUNITY
Start: 2021-08-24

## 2021-09-17 RX ORDER — RIMEGEPANT SULFATE 75 MG/75MG
TABLET, ORALLY DISINTEGRATING ORAL
COMMUNITY
Start: 2021-08-24 | End: 2021-12-06

## 2021-09-17 RX ORDER — PROMETHAZINE HYDROCHLORIDE 25 MG/1
25 TABLET ORAL EVERY 6 HOURS PRN
COMMUNITY
Start: 2021-08-24 | End: 2021-12-06 | Stop reason: SDUPTHER

## 2021-09-17 RX ORDER — BUTALBITAL, ACETAMINOPHEN AND CAFFEINE 50; 325; 40 MG/1; MG/1; MG/1
1 TABLET ORAL 4 TIMES DAILY PRN
COMMUNITY
Start: 2021-08-24

## 2021-09-17 RX ORDER — TOPIRAMATE 50 MG/1
TABLET, FILM COATED ORAL
Qty: 30 TABLET | Refills: 3 | Status: SHIPPED | OUTPATIENT
Start: 2021-09-17 | End: 2021-12-06

## 2021-09-17 NOTE — PROGRESS NOTES
Subjective:     Patient ID: Odalys Alaniz is a 41 y.o. female.    CC:   Chief Complaint   Patient presents with   • Migraine       HPI:   History of Present Illness   This is a very pleasant 41-year-old female who presents for initial neurological evaluation of severe worsening migraines and frequency and severity over the past 3 months.  She was referred by her primary care provider for evaluation.  She was diagnosed with migraine headaches at age 15 and was even in the Imitrex nasal spray study trial as a child.  She tells me that after adolescence her migraines went to just 1-2 episodes per year with aura.  Over the past 3 months she has started developing severe migraines with frontal pain, throbbing, nausea, vomiting, severe dizziness, numbness, tingling in her face that radiates down both of her arms, she feels numb and tingling in her face, she has been laying in bed for 3 to 4 days with severe migraines weekly over the past 3 months.  She feels severe nausea or dizziness when she would get up from lying down.  She had been taking sumatriptan for years and this had always been effective for her until her recent migraine spells.  She recently saw her PCP on 8/24/2021 and was prescribed Flexeril, Phenergan, Fioricet to take at onset of severe migraines.  She has also been prescribe Nurtec ODT to take every 3 days to try to prevent migraines.  Last weekend she did take Fioricet, Flexeril and Phenergan and went to sleep for a severe migraine.  Since that time she has taken 1 Nurtec and has not required any other additional medications.  She has had a recent dilated eye exam and states that her vision is completely normal with no papilledema or other abnormalities.  She tells me that recently when she got a massage she had blurred vision afterwards but this did finally resolved.  She is always had significant neck pain and tightness.  Earlier this year she started having right arm weakness following a physical  therapy visit, numbness and tingling and was referred for EMG and NCVS and was found to have no abnormalities.  When she started having severe headaches she put the physical therapy on hold. 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 She also notices tremors in both hands for a few years and her dad has essential tremors. She has had no neuro imaging, but she has pending MRI brain w/wo contrast. Xray C spine 1/2021 normal per radiology. Unfortunately she also has right foot boot for very recent stress fracture of her right foot without injury-seeing podiatry. She was remotely prescribed amitriptyline for anxiety and migraines in [facility] and was on up to 150mg a day and eventually this stopped being effective. It never helps her headaches. Mom has migraines too but none with this type of severity. No known cause for recent severe headaches or changes in headaches.    The following portions of the patient's history were reviewed and updated as appropriate: allergies, current medications, past family history, past medical history, past social history, past surgical history and problem list.    Past Medical History:   Diagnosis Date   • Arrhythmia    • Difficulty walking    • Dyspnea    • Headache, tension-type    • Migraine    • Pneumonia    • Syncope        Past Surgical History:   Procedure Laterality Date   • CYST REMOVAL         Social History     Socioeconomic History   • Marital status:      Spouse name: Not on file   • Number of children: Not on file   • Years of education: Not on file   • Highest education level: Not on file   Tobacco Use   • Smoking status: Never Smoker   • Smokeless tobacco: Never Used   Vaping Use   • Vaping Use: Never used   Substance and Sexual Activity   • Alcohol use: Yes      "Alcohol/week: 2.0 standard drinks     Types: 2 Glasses of wine per week     Comment: occas   • Drug use: No   • Sexual activity: Yes       Family History   Problem Relation Age of Onset   • Hypertension Mother    • Skin cancer Mother    • Melanoma Mother    • Migraines Mother    • Hypertension Father    • Diabetes Maternal Grandmother         Review of Systems   Constitutional: Positive for activity change and fatigue. Negative for chills, fever and unexpected weight change.   HENT: Negative for congestion, ear pain, hearing loss, nosebleeds, rhinorrhea, sinus pressure and sore throat.    Eyes: Positive for visual disturbance. Negative for photophobia, pain, discharge and itching.   Respiratory: Negative for cough, chest tightness, shortness of breath and wheezing.    Cardiovascular: Negative for chest pain, palpitations and leg swelling.   Gastrointestinal: Negative for abdominal pain, blood in stool, constipation, diarrhea, nausea and vomiting.   Genitourinary: Negative for dysuria, frequency, hematuria and urgency.   Musculoskeletal: Positive for back pain and neck pain. Negative for arthralgias, gait problem, joint swelling, myalgias and neck stiffness.   Skin: Positive for rash. Negative for wound.   Allergic/Immunologic: Positive for environmental allergies. Negative for food allergies.   Neurological: Positive for dizziness, tremors, weakness, light-headedness, numbness and headaches. Negative for seizures, syncope and speech difficulty.   Hematological: Negative for adenopathy. Does not bruise/bleed easily.   Psychiatric/Behavioral: Positive for sleep disturbance. Negative for agitation, confusion, decreased concentration, hallucinations and suicidal ideas. The patient is nervous/anxious.    All other systems reviewed and are negative.       Objective:  /64   Pulse 99   Ht 160 cm (63\")   Wt 78.9 kg (174 lb)   SpO2 90%   BMI 30.82 kg/m²     Neurologic Exam     Mental Status   Oriented to person, " place, and time.   Registration: recalls 3 of 3 objects. Recall at 5 minutes: recalls 3 of 3 objects. Follows 3 step commands.   Attention: normal. Concentration: normal.   Speech: speech is normal   Level of consciousness: alert  Knowledge: good and consistent with education. Able to perform simple calculations.   Able to name object. Able to read. Able to repeat. Able to write. Normal comprehension.     Cranial Nerves     CN II   Visual fields full to confrontation.     CN III, IV, VI   Pupils are equal, round, and reactive to light.  Extraocular motions are normal.     CN V   Right facial sensation deficit: mandible and cheeks  Left facial sensation deficit: cheeks and mandible    CN VII   Facial expression full, symmetric.     CN VIII   CN VIII normal.     CN IX, X   CN IX normal.   CN X normal.     CN XI   CN XI normal.     CN XII   CN XII normal.     Motor Exam   Muscle bulk: normal  Overall muscle tone: normal    Strength   Strength 5/5 except as noted.   Right foot in boot for recent stress fracture-reports right arm weakness and left leg/hip weakness     Sensory Exam   Light touch normal.   Vibration normal.   Proprioception normal.   Pinprick normal.     Gait, Coordination, and Reflexes     Gait  Gait: normal    Coordination   Romberg: negative  Finger to nose coordination: normal  Heel to shin coordination: normal  Tandem walking coordination: normal    Tremor   Resting tremor: absent  Intention tremor: absent  Action tremor: absent    Reflexes   Right brachioradialis: 3+  Left brachioradialis: 3+  Right biceps: 3+  Left biceps: 3+  Right patellar: 3+  Left patellar: 3+  Right achilles: 3+  Left achilles: 3+  Right : 2+  Left : 2+  Right plantar: normal  Left plantar: normal  Right Morillo: absent  Left Morillo: present  Right ankle clonus: absent  Left ankle clonus: absent      Physical Exam  Constitutional:       Appearance: Normal appearance.   Eyes:      Extraocular Movements: EOM normal.       Pupils: Pupils are equal, round, and reactive to light.      Right eye: Pupil is not sluggish.      Left eye: Pupil is not sluggish.      Funduscopic exam:     Right eye: Red reflex present.         Left eye: Red reflex present.     Visual Fields: Right eye visual fields normal and left eye visual fields normal.   Cardiovascular:      Rate and Rhythm: Tachycardia present.      Heart sounds: S1 normal and S2 normal.   Pulmonary:      Effort: Pulmonary effort is normal.      Breath sounds: Normal breath sounds.   Musculoskeletal:      Cervical back: No edema, erythema, signs of trauma, rigidity, torticollis or crepitus. Pain with movement and muscular tenderness present. No spinous process tenderness. Decreased range of motion.   Neurological:      Mental Status: She is alert and oriented to person, place, and time.      Coordination: Finger-Nose-Finger Test, Heel to Shin Test and Romberg Test normal.      Gait: Gait is intact. Tandem walk normal.      Deep Tendon Reflexes:      Reflex Scores:       Bicep reflexes are 3+ on the right side and 3+ on the left side.       Brachioradialis reflexes are 3+ on the right side and 3+ on the left side.       Patellar reflexes are 3+ on the right side and 3+ on the left side.       Achilles reflexes are 3+ on the right side and 3+ on the left side.  Psychiatric:         Mood and Affect: Mood is anxious.         Speech: Speech normal.         Behavior: Behavior normal.         Thought Content: Thought content normal.         Cognition and Memory: Cognition and memory normal.         Judgment: Judgment normal.         Assessment/Plan:       Diagnoses and all orders for this visit:    1. Paresthesia (Primary)  -     Cancel: MRI Brain With & Without Contrast; Future  -     Cancel: MRI Cervical Spine With & Without Contrast; Future  -     Cancel: MRI Thoracic Spine With & Without Contrast; Future  -     Methylmalonic Acid, Serum; Future  -     Sedimentation Rate; Future  -     TSH;  Future  -     T4, free; Future  -     Vitamin D 25 Hydroxy; Future  -     Vitamin B12 & Folate; Future  -     KIM by IFA, Reflex 9-biomarkers profile; Future  -     CBC & Differential; Future  -     Comprehensive Metabolic Panel; Future  -     CK; Future  -     GABRIEL + PE; Future  -     C-reactive Protein; Future  -     MRI Brain With & Without Contrast; Future  -     MRI Cervical Spine With & Without Contrast; Future  -     MRI Thoracic Spine With & Without Contrast; Future    2. Right arm weakness  -     Cancel: MRI Brain With & Without Contrast; Future  -     Cancel: MRI Cervical Spine With & Without Contrast; Future  -     MRI Brain With & Without Contrast; Future  -     MRI Cervical Spine With & Without Contrast; Future    3. Left leg weakness  -     Cancel: MRI Thoracic Spine With & Without Contrast; Future  -     MRI Brain With & Without Contrast; Future  -     MRI Thoracic Spine With & Without Contrast; Future    4. Intractable migraine with aura without status migrainosus  -     topiramate (TOPAMAX) 50 MG tablet; Take 1/2 tablet at night for 2 weeks then increase to 1 tablet at night  Dispense: 30 tablet; Refill: 3  -     predniSONE (DELTASONE) 10 MG tablet; Take 6 tabs x 2 days, Take 5 tabs x 2 days, take 4 tabs x 2 days, take 3 tabs x 2 days, take 2 tabs x 2 days and 1 tab x 2 days and stop  Dispense: 42 tablet; Refill: 0    5. Cervicalgia  -     MRI Cervical Spine With & Without Contrast; Future         Total time of visit today 45 minutes spent reviewing PCP notes and records, obtaining history from the patient, completing full exam, discussing recommendations moving forward as well as documentation of today's visit.    She has hyperreflexia in all 4 extremities as well as a positive Luis's on the left side.  Recommend MRI of the brain, cervical and thoracic spine to evaluate for demyelinating lesions.  We will try to get these scheduled as soon as possible since she is requiring IV sedation due to  claustrophobia.  I have also recommended her take prednisone taper if she develops another severe migraine.  We will go ahead and start her on low-dose Topamax for migraine prevention.  I would recommend that she continue her cocktail of Fioricet, Flexeril and Phenergan at onset of severe migraine and including rest and not driving until this is resolved.  She can use the Nurtec as needed.  Differential diagnosis of multiple sclerosis or complex migraines.  We are going to follow-up in 10 to 12 weeks or sooner if needed.  If any abnormalities on scans we will move forward with lumbar puncture and referral to our MS specialist.  If MRIs are completely clear we will continue to treat for complex migraines.  If she has any worsening symptoms recommend going to the ER for further evaluation.  I have also ordered full lab panel to rule out other etiology of symptoms.    Reviewed medications, potential side effects and signs and symptoms to report. Discussed risk versus benefits of treatment plan with patient and/or family-including medications, labs and radiology that may be ordered. Addressed questions and concerns during visit. Patient and/or family verbalized understanding and agree with plan.    AS THE PROVIDER, I PERSONALLY WORE PPE DURING ENTIRE FACE TO FACE ENCOUNTER IN CLINIC WITH THE PATIENT. PATIENT ALSO WORE PPE DURING ENTIRE FACE TO FACE ENCOUNTER EXCEPT FOR A MAX OF 30 SECONDS DURING NEUROLOGICAL EVALUATION OF CRANIAL NERVES AND THEN MASK WAS PLACED BACK OVER PATIENT FACE FOR REMAINDER OF VISIT. I WASHED MY HANDS BEFORE AND AFTER VISIT.    During this visit the following were done:  Labs Reviewed []    Labs Ordered [x]    Radiology Reports Reviewed []    Radiology Ordered [x]    PCP Records Reviewed [x]    Referring Provider Records Reviewed [x]    ER Records Reviewed []    Hospital Records Reviewed []    History Obtained From Family []    Radiology Images Reviewed []    Other Reviewed [x]    Records  Requested []      Ailyn Jackson, APRN  9/17/2021

## 2021-09-18 LAB
ALBUMIN SERPL-MCNC: 4.6 G/DL (ref 3.5–5.2)
ALBUMIN/GLOB SERPL: 2.3 G/DL
ALP SERPL-CCNC: 48 U/L (ref 39–117)
ALT SERPL W P-5'-P-CCNC: 31 U/L (ref 1–33)
ANION GAP SERPL CALCULATED.3IONS-SCNC: 13 MMOL/L (ref 5–15)
AST SERPL-CCNC: 25 U/L (ref 1–32)
BILIRUB SERPL-MCNC: 0.2 MG/DL (ref 0–1.2)
BUN SERPL-MCNC: 21 MG/DL (ref 6–20)
BUN/CREAT SERPL: 24.7 (ref 7–25)
CALCIUM SPEC-SCNC: 9.2 MG/DL (ref 8.6–10.5)
CHLORIDE SERPL-SCNC: 104 MMOL/L (ref 98–107)
CK SERPL-CCNC: 88 U/L (ref 20–180)
CO2 SERPL-SCNC: 22 MMOL/L (ref 22–29)
CREAT SERPL-MCNC: 0.85 MG/DL (ref 0.57–1)
CRP SERPL-MCNC: <0.3 MG/DL (ref 0–0.5)
GFR SERPL CREATININE-BSD FRML MDRD: 74 ML/MIN/1.73
GLOBULIN UR ELPH-MCNC: 2 GM/DL
GLUCOSE SERPL-MCNC: 87 MG/DL (ref 65–99)
POTASSIUM SERPL-SCNC: 4.1 MMOL/L (ref 3.5–5.2)
PROT SERPL-MCNC: 6.6 G/DL (ref 6–8.5)
SODIUM SERPL-SCNC: 139 MMOL/L (ref 136–145)
T4 FREE SERPL-MCNC: 1.02 NG/DL (ref 0.93–1.7)
TSH SERPL DL<=0.05 MIU/L-ACNC: 2.62 UIU/ML (ref 0.27–4.2)

## 2021-09-20 ENCOUNTER — TELEPHONE (OUTPATIENT)
Dept: NEUROLOGY | Facility: CLINIC | Age: 41
End: 2021-09-20

## 2021-09-20 LAB
ANA TITR SER IF: NEGATIVE {TITER}
LABORATORY COMMENT REPORT: NORMAL

## 2021-09-20 NOTE — TELEPHONE ENCOUNTER
----- Message from JG Mosley sent at 9/20/2021 10:41 AM EDT -----  Please notify the patient that her liver and kidney function looks normal, her thyroid levels are normal, her muscle enzymes were normal, her inflammatory markers are normal, her vitamin B12 level is in the very low range and I would recommend she take vitamin B12 at 1000 mcg daily over-the-counter.  Her vitamin D is also in the low normal range and would recommend she obtain vitamin D3 5000 units daily to take over-the-counter.  Her blood counts look good overall.  A few additional labs are pending to result.  We will follow-up as scheduled in clinic and can repeat vitamin B12 and vitamin D levels at follow-up to ensure these are improving.  Please fax a copy of all labs to PCP.  Thanks, JG Alegre.

## 2021-09-20 NOTE — PROGRESS NOTES
Please notify the patient that her liver and kidney function looks normal, her thyroid levels are normal, her muscle enzymes were normal, her inflammatory markers are normal, her vitamin B12 level is in the very low range and I would recommend she take vitamin B12 at 1000 mcg daily over-the-counter.  Her vitamin D is also in the low normal range and would recommend she obtain vitamin D3 5000 units daily to take over-the-counter.  Her blood counts look good overall.  A few additional labs are pending to result.  We will follow-up as scheduled in clinic and can repeat vitamin B12 and vitamin D levels at follow-up to ensure these are improving.  Please fax a copy of all labs to PCP.  Thanks, JG Alegre.

## 2021-09-21 LAB
ALBUMIN SERPL ELPH-MCNC: 4.1 G/DL (ref 2.9–4.4)
ALBUMIN/GLOB SERPL: 1.9 {RATIO} (ref 0.7–1.7)
ALPHA1 GLOB SERPL ELPH-MCNC: 0.2 G/DL (ref 0–0.4)
ALPHA2 GLOB SERPL ELPH-MCNC: 0.6 G/DL (ref 0.4–1)
B-GLOBULIN SERPL ELPH-MCNC: 0.8 G/DL (ref 0.7–1.3)
GAMMA GLOB SERPL ELPH-MCNC: 0.6 G/DL (ref 0.4–1.8)
GLOBULIN SER-MCNC: 2.2 G/DL (ref 2.2–3.9)
IGA SERPL-MCNC: 91 MG/DL (ref 87–352)
IGG SERPL-MCNC: 577 MG/DL (ref 586–1602)
IGM SERPL-MCNC: 90 MG/DL (ref 26–217)
INTERPRETATION SERPL IEP-IMP: ABNORMAL
LABORATORY COMMENT REPORT: ABNORMAL
M PROTEIN SERPL ELPH-MCNC: ABNORMAL G/DL
PROT SERPL-MCNC: 6.3 G/DL (ref 6–8.5)

## 2021-09-22 ENCOUNTER — TELEPHONE (OUTPATIENT)
Dept: NEUROLOGY | Facility: CLINIC | Age: 41
End: 2021-09-22

## 2021-09-22 NOTE — TELEPHONE ENCOUNTER
----- Message from JG Mosley sent at 9/17/2021  4:10 PM EDT -----  I ordered asap mri brain/c/t spine to [facility] for ms. She needs sedation iv. [facility] can do this. Can we see if we can get this approved soon. I can help if needed. Thanks!!!

## 2021-09-22 NOTE — PROGRESS NOTES
Please notify Odalys her autoimmune panel is negative. Her immune system panel is essentially normal. IgG is minimally low, but the total of all of her IG levels together is in the normal range. These minor changes are not significant, not concerning and warrant no further workup based on the labs alone at this time. I do recommend she complete MRIs as scheduled & we will follow up in clinic as scheduled. Thanks, JG Alegre     FAX labs to PCP

## 2021-09-22 NOTE — TELEPHONE ENCOUNTER
I CALLED [facility] TO SCHEDULE THE TEST AND IT WAS DENIED BECAUSE THE SEDATION ONLY LAST ABOUT 30 MINS AND THE MRI IS AT LEAST 2 HOURS.

## 2021-09-22 NOTE — TELEPHONE ENCOUNTER
2 options-see if patient can do an open mri at [facility]? I can send in valium for her to take a dose 30-45 minutes prior and 1 just as scan begins.    Other option would be  but this could take months and I think she needs her scans soon.     Britni Rodas said you can schedule either place based on patient preference but I do recommend Open MRI with oral sedation to try first. These are ASAP.    Let me know.    Thanks, Ailyn

## 2021-09-23 ENCOUNTER — TELEPHONE (OUTPATIENT)
Dept: NEUROLOGY | Facility: CLINIC | Age: 41
End: 2021-09-23

## 2021-09-23 NOTE — TELEPHONE ENCOUNTER
I CALLED PT AND SHE IS WILLING TO TRY THE MRI WITH OPEN MRI  [facility] AND [facility] AND ASK ME TO CALL [facility] AND ASK THEM TO CALL HER TO SCHEDULE DUE TO NEEDING A  AND SO FORTH.  I WILL CALL AND SCHEDULE TOMORROW.

## 2021-09-23 NOTE — TELEPHONE ENCOUNTER
----- Message from JG Mosley sent at 9/22/2021  7:48 AM EDT -----  Please notify Odalys her autoimmune panel is negative. Her immune system panel is essentially normal. IgG is minimally low, but the total of all of her IG levels together is in the normal range. These minor changes are not significant, not concerning and warrant no further workup based on the labs alone at this time. I do recommend she complete MRIs as scheduled & we will follow up in clinic as scheduled. Thanks, JG Alegre     FAX labs to PCP

## 2021-09-24 ENCOUNTER — TELEPHONE (OUTPATIENT)
Dept: NEUROLOGY | Facility: OTHER | Age: 41
End: 2021-09-24

## 2021-09-24 NOTE — TELEPHONE ENCOUNTER
PT CALLED IN REGARDS TO NOT BEING ABLE TO DO OPEN MRI AT [facility].  UPON READING PREVIOUS TELEPHONE ENCOUNTERS REGARDING MATTER, I WARM TRANSFERRED PT TO DONALD AT [facility].

## 2021-09-24 NOTE — TELEPHONE ENCOUNTER
I CALLED [facility] AND OPEN MRI AND THEY REQUEST PT DOES NOT TAKE ANY SEDATION  PRIOR AND THEY WILL GIVE HER THE SEDATIVE WHEN SHE ARRIVES.  SHE WILL STILL NEED A  AND I FAXED OVER ALL THREE ORDERS WITH NO NEED FOR AUTH REFERENCE # ON THE COVER SHEET PT IS AWARE TO CALL AND SCHEDULED PER PT REQUEST AND I GAVE HER THE # TO DO SO.

## 2021-09-27 ENCOUNTER — TELEPHONE (OUTPATIENT)
Dept: NEUROLOGY | Facility: CLINIC | Age: 41
End: 2021-09-27

## 2021-09-27 ENCOUNTER — PATIENT ROUNDING (BHMG ONLY) (OUTPATIENT)
Dept: NEUROLOGY | Facility: CLINIC | Age: 41
End: 2021-09-27

## 2021-09-27 NOTE — TELEPHONE ENCOUNTER
PT CALLED BACK AND SAID [facility] COULD ONLY DO TWO OF THE THREE IMAGING AND SHE REQUESTED I SEND THEM TO [facility] EVEN THOUGH IT WILL TAKE LONGER, I SENT THROUGH [facility] PORTAL ON Friday, 9-

## 2021-09-27 NOTE — TELEPHONE ENCOUNTER
Called Pt to do Patient Rounding calls. PT stated only thing they are waiting to hear back on is sedated MRI at .

## 2021-09-27 NOTE — TELEPHONE ENCOUNTER
JULES WITH  CALLED AS THEY WILL NEED SEDATION PPW FOR THIS PT TO BE SEDATED, SHE STATES THAT THIS COULD BE LATE November INTO December BEFORE PT COULD BE SCHEDULED FOR THESE SCANS. JULES SENT OVER SEDATION FORM -[phone] CHRISTEL BENNETT.

## 2021-09-27 NOTE — PROGRESS NOTES
September 27, 2021    Hello, may I speak with Odalys Alaniz?    My name is Malissa      I am  with [facility]  [address]  [facility] [id number] [phone].    Before we get started may I verify your date of birth? 1980    I am calling to officially welcome you to our practice and ask about your recent visit. Is this a good time to talk? Yes    Tell me about your visit with us. What things went well?  Everything during the visit was really good. Ailyn was great. Pt was pleasantly surprised with how much time she took to go over everyhting with her and address everything.       We're always looking for ways to make our patients' experiences even better. Do you have recommendations on ways we may improve?  No    Overall were you satisfied with your first visit to our practice? Yes       I appreciate you taking the time to speak with me today. Is there anything else I can do for you? Yes, just waiting to hear back about [facility].      Thank you, and have a great day.

## 2021-09-28 NOTE — TELEPHONE ENCOUNTER
I CALLED AND LVM WITH PT LETTING HER KNOW [facility] DOES SEDATION BY PILL FORM ONLY MUCH LIKE [facility] OFFERS BUT NOT TOTAL SEDATION.   SENT [facility] A FORM TO FILL OUT TO AUTHORIZE THE SEDATION AND FAX BACK AS WELL AND SHE NEEDS TO KNOW WHICH SHE PREFERS.  BOTH [facility] AND  ARE BOOKING OUT ABOUT THE SAME WITH  BEING A LITTLE FARTHER OUT FOR TOTAL SEDATION.

## 2021-09-29 LAB
Lab: NORMAL
METHYLMALONATE SERPL-SCNC: 339 NMOL/L (ref 0–378)

## 2021-11-15 ENCOUNTER — TELEPHONE (OUTPATIENT)
Dept: NEUROLOGY | Facility: CLINIC | Age: 41
End: 2021-11-15

## 2021-11-29 ENCOUNTER — TELEPHONE (OUTPATIENT)
Dept: NEUROLOGY | Facility: CLINIC | Age: 41
End: 2021-11-29

## 2021-12-01 NOTE — TELEPHONE ENCOUNTER
I spoke to [facility] and they will go ahead and do the mri brain and cervical and cancel the thoracic since it isn't approved yet.

## 2021-12-01 NOTE — TELEPHONE ENCOUNTER
Yes, I want her to have the MRI of the brain regardless of the status of the spine MRIs. I am not sure what is taking so long for the approval. She has an abnormal physical exam so these should be covered. We are looking for MS/demyelinating disease-abnormal skin sensation and hyper reflexes. Thanks, Ailyn

## 2021-12-01 NOTE — TELEPHONE ENCOUNTER
This was already took care of and scheduled for 12/03/2021 but the patient called last week and she had gotten new insurance. We had to reauth with the new insurance.

## 2021-12-01 NOTE — TELEPHONE ENCOUNTER
OK. That makes sense. I thought it was approved already. Thanks for the update. Hopefully everything will go through.

## 2021-12-06 ENCOUNTER — OFFICE VISIT (OUTPATIENT)
Dept: NEUROLOGY | Facility: CLINIC | Age: 41
End: 2021-12-06

## 2021-12-06 VITALS
SYSTOLIC BLOOD PRESSURE: 120 MMHG | BODY MASS INDEX: 30.65 KG/M2 | OXYGEN SATURATION: 98 % | DIASTOLIC BLOOD PRESSURE: 80 MMHG | HEART RATE: 94 BPM | WEIGHT: 173 LBS

## 2021-12-06 DIAGNOSIS — M54.2 CERVICALGIA: ICD-10-CM

## 2021-12-06 DIAGNOSIS — G43.119 INTRACTABLE MIGRAINE WITH AURA WITHOUT STATUS MIGRAINOSUS: Primary | ICD-10-CM

## 2021-12-06 DIAGNOSIS — R29.898 LEFT LEG WEAKNESS: ICD-10-CM

## 2021-12-06 DIAGNOSIS — E34.8 PINEAL GLAND CYST: ICD-10-CM

## 2021-12-06 DIAGNOSIS — R20.2 PARESTHESIA: ICD-10-CM

## 2021-12-06 PROCEDURE — 99214 OFFICE O/P EST MOD 30 MIN: CPT | Performed by: NURSE PRACTITIONER

## 2021-12-06 RX ORDER — TOPIRAMATE 50 MG/1
50 TABLET, FILM COATED ORAL 2 TIMES DAILY
Qty: 60 TABLET | Refills: 5 | Status: SHIPPED | OUTPATIENT
Start: 2021-12-06

## 2021-12-06 RX ORDER — PROMETHAZINE HYDROCHLORIDE 25 MG/1
25 TABLET ORAL EVERY 6 HOURS PRN
Qty: 20 TABLET | Refills: 2 | Status: SHIPPED | OUTPATIENT
Start: 2021-12-06

## 2021-12-06 RX ORDER — UBROGEPANT 100 MG/1
TABLET ORAL
Qty: 10 TABLET | Refills: 5 | Status: SHIPPED | OUTPATIENT
Start: 2021-12-06

## 2021-12-06 NOTE — PATIENT INSTRUCTIONS
[employer]    Vitamin D3 5,000 units     Vitamin B12 1000 units daily or B Complex Multivitamin

## 2021-12-06 NOTE — PROGRESS NOTES
Subjective:     Patient ID: Odalys Alaniz is a 41 y.o. female.    CC:   Chief Complaint   Patient presents with   • Migraine       HPI:   History of Present Illness     This is a pleasant 41-year-old female who presents for 3-month follow-up on migraine headaches as well as left leg weakness, neck and mid thoracic spine pain and tightness and numbness and tingling which starts in the middle of the spine and radiates up to her head, or around her face and into both arms.  Since last visit she did have an MRI of the brain and cervical spine with and without contrast completed with IV sedation a  hospital on 12/3/2021.  I only have an MRI radiology reports.  Contrast shows no significant abnormalities within the brain.  No abnormal contrast lesions to suggest demyelinating process.  There is an enlarged pineal gland about 13 mm in size with mildly enhancing peripheral wall.  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  Due to the size radiologist with recommend additional evaluation to exclude cystic pineal cytoma.  Per radiologist the pineal gland area does contact the tectal plate with possible mild mass-effect.  No evidence of hydrocephalus.  MRI of the cervical spine with and without contrast per radiology report does show some mild degenerative changes without spinal canal or neuroforaminal narrowing.  No evidence of spinal cord lesion to suggest demyelinating process.  We had also ordered an MRI of the thoracic spine but unfortunately this is not approved you for her additional testing was completed.  It has since been approved with her new insurance that she needs to be scheduled for this.  She feels like she could do the lower and mid spine and an open MRI without sedation.  She denies any perineal numbness or tingling.  Denies significant constipation, urinary hesitancy or bowel or bladder incontinence. She uses flexeril PRN. She did have an EMG and NCVS in early 2021 which showed no abnormalities of the  right arm.  She feels like she continues to have right arm weakness as well.  She was taking Nurtec ODT every 3 days to prevent migraines but never saw improvement.  She has just switched to as needed and still does not feel that it helps with the severity of her migraines.  We did start her on topiramate last visit and she is up to 50 mg at night and has seen some reduction in frequency of migraines and would like to increase to see if this will help with the severity of the headaches.    History obtained at initial visit September 20201:  She was diagnosed with migraine headaches at age 15 and was even in the Imitrex nasal spray study trial as a child.  She tells me that after adolescence her migraines went to just 1-2 episodes per year with aura.  Over the past 6 months she has started developing severe migraines with frontal pain, throbbing, nausea, vomiting, severe dizziness, numbness, tingling in her face that radiates down both of her arms, she feels numb and tingling in her face, she has been laying in bed for 3 to 4 days with severe migraines weekly over the past 3 months.  She feels severe nausea or dizziness when she would get up from lying down.  She had been taking sumatriptan for years and this had always been effective for her until her recent migraine spells. She has used other triptans including rizatriptan and zolmitriptan which were not effective and has poor side effects.  She recently saw her PCP on 8/24/2021 and was prescribed Flexeril, Phenergan, Fioricet to take at onset of severe migraines.  She has also been prescribe Nurtec ODT to take every 3 days to try to prevent migraines. Earlier this year she started having right arm weakness following a physical therapy visit, numbness and tingling and was referred for EMG and NCVS and was found to have no abnormalities.  When she started having severe headaches she put the physical therapy on hold. 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 She also notices tremors in both hands for a few years and her dad has essential tremors. [facility] was on up to 150mg a day and eventually this stopped being effective.  Mom has migraines too but none with this type of severity.      The following portions of the patient's history were reviewed and updated as appropriate: allergies, current medications, past family history, past medical history, past social history, past surgical history and problem list.    Past Medical History:   Diagnosis Date   • Arrhythmia    • Difficulty walking    • Dyspnea    • Headache, tension-type    • Migraine    • Pneumonia    • Syncope        Past Surgical History:   Procedure Laterality Date   • CYST REMOVAL         Social History     Socioeconomic History   • Marital status:    Tobacco Use   • Smoking status: Never Smoker   • Smokeless tobacco: Never Used   Vaping Use   • Vaping Use: Never used   Substance and Sexual Activity   • Alcohol use: Yes     Alcohol/week: 2.0 standard drinks     Types: 2 Glasses of wine per week     Comment: occas   • Drug use: No   • Sexual activity: Yes       Family History   Problem Relation Age of Onset   • Hypertension Mother    • Skin cancer Mother    • Melanoma Mother    • Migraines Mother    • Hypertension Father    • Diabetes Maternal Grandmother         Review of Systems   Constitutional: Negative for chills, fatigue, fever and unexpected weight change.   HENT: Negative for ear pain, hearing loss, nosebleeds, rhinorrhea and sore throat.    Eyes: Negative for photophobia, pain, discharge, itching and visual disturbance.   Respiratory: Negative for cough, chest tightness, shortness of breath and wheezing.    Cardiovascular:  Negative for chest pain, palpitations and leg swelling.   Gastrointestinal: Negative for abdominal pain, blood in stool, constipation, diarrhea, nausea and vomiting.   Genitourinary: Negative for dysuria, frequency, hematuria and urgency.   Musculoskeletal: Positive for back pain and neck pain. Negative for arthralgias, gait problem, joint swelling, myalgias and neck stiffness.   Skin: Negative for rash and wound.   Allergic/Immunologic: Negative for environmental allergies and food allergies.   Neurological: Positive for weakness, numbness and headaches. Negative for dizziness, tremors, seizures, syncope, speech difficulty and light-headedness.   Hematological: Negative for adenopathy. Does not bruise/bleed easily.   Psychiatric/Behavioral: Negative for agitation, confusion, decreased concentration, hallucinations, sleep disturbance and suicidal ideas. The patient is not nervous/anxious.    All other systems reviewed and are negative.       Objective:  /80   Pulse 94   Wt 78.5 kg (173 lb)   SpO2 98%   BMI 30.65 kg/m²     Neurologic Exam     Mental Status   Oriented to person, place, and time.   Speech: speech is normal   Level of consciousness: alert    Cranial Nerves   Cranial nerves II through XII intact.     Motor Exam   Muscle bulk: normal  Overall muscle tone: normal    Strength   Strength 5/5 throughout.   Reports weakness in right arm and left leg compared to other side, no atrophy on exam.     Sensory Exam   Light touch normal.   Vibration normal.   Proprioception normal.   Pinprick normal.     Gait, Coordination, and Reflexes     Gait  Gait: normal    Coordination   Finger to nose coordination: normal    Tremor   Resting tremor: absent  Intention tremor: absent  Action tremor: absent    Reflexes   Right brachioradialis: 3+  Left brachioradialis: 3+  Right biceps: 3+  Left biceps: 3+  Right patellar: 3+  Left patellar: 3+  Right achilles: 3+  Left achilles: 3+  Right : 2+  Left : 2+  Right  plantar: normal  Left plantar: normal  Right ankle clonus: absent  Left ankle clonus: absent      Physical Exam  Musculoskeletal:      Cervical back: Spasms and tenderness present. No swelling, edema, deformity, erythema, signs of trauma, lacerations, rigidity, torticollis, bony tenderness or crepitus. No pain with movement. Decreased range of motion.      Thoracic back: Spasms and tenderness present. No swelling, edema, deformity, signs of trauma, lacerations or bony tenderness. Decreased range of motion. No scoliosis.      Lumbar back: Tenderness present. No swelling, edema, deformity, signs of trauma, lacerations, spasms or bony tenderness. Decreased range of motion. Negative right straight leg raise test and negative left straight leg raise test. No scoliosis.   Neurological:      Mental Status: She is oriented to person, place, and time.      Coordination: Finger-Nose-Finger Test normal.      Gait: Gait is intact.      Deep Tendon Reflexes: Strength normal.      Reflex Scores:       Bicep reflexes are 3+ on the right side and 3+ on the left side.       Brachioradialis reflexes are 3+ on the right side and 3+ on the left side.       Patellar reflexes are 3+ on the right side and 3+ on the left side.       Achilles reflexes are 3+ on the right side and 3+ on the left side.  Psychiatric:         Mood and Affect: Mood is anxious.         Speech: Speech normal.         Behavior: Behavior normal.         Thought Content: Thought content normal.         Cognition and Memory: Cognition and memory normal.         Judgment: Judgment normal.         Assessment/Plan:       Diagnoses and all orders for this visit:    1. Intractable migraine with aura without status migrainosus (Primary)  -     topiramate (TOPAMAX) 50 MG tablet; Take 1 tablet by mouth 2 (Two) Times a Day.  Dispense: 60 tablet; Refill: 5  -     ubrogepant 100 MG tablet; Take 1 tablet at onset of migraine, may repeat once in 2 hours if needed  Dispense: 10  tablet; Refill: 5  -     Ambulatory Referral to Physical Therapy Evaluate and treat  -     promethazine (PHENERGAN) 25 MG tablet; Take 1 tablet by mouth Every 6 (Six) Hours As Needed for Nausea. for nausea  Dispense: 20 tablet; Refill: 2    2. Left leg weakness  Comments:  r/s mri t spine w/wo along with mri l spine  Orders:  -     MRI Lumbar Spine With & Without Contrast; Future  -     Ambulatory Referral to Physical Therapy Evaluate and treat    3. Paresthesia  -     MRI Lumbar Spine With & Without Contrast; Future  -     Ambulatory Referral to Physical Therapy Evaluate and treat    4. Cervicalgia  -     Ambulatory Referral to Physical Therapy Evaluate and treat    5. Pineal gland cyst  Comments:  13mm per radiology report at  12/3/2021 to obtain disc  Orders:  -     Ambulatory Referral to Neurosurgery           Physiologic hyperreflexia versus hyperreflexia, compression or lesion of the thoracic or lumbar spine.  Will preauthorize MRI of the T-spine with and without contrast along with MRI of the L-spine with and without contrast to be completed at an open MRI location.  She is to obtain the MRI of the brain and cervical spine imaging from .  Referral for physical therapy.  We will increase her topiramate dose.  We will add Ubrelvy as needed and discontinue Nurtec ODT.  Continue Phenergan as needed.  Consider additional preventative medication for migraines at follow-up in 3 months.  Referral to neurosurgery per her request to evaluate the pineal gland cyst.  She is aware she has had imaging and disc with her for her appointment.  No urinary or bowel incontinence or perineal numbness or tingling or hesitancy.  No significant weakness on exam today.  She is aware that if she develops any of the symptoms to present to the ER for further evaluation.  If we do not find any lesions with recommend consideration of second opinion with [facility] if symptoms persist.  Labs from last visit including CRP normal,  immunofixation Aimovig electrophoresis normal CK normal CMP normal CBC with differential normal, KIM negative, vitamin B12 and folic acid all within normal limits.  Recommend vitamin D3 5000 units daily over-the-counter.  Recommend B complex or vitamin B12 1000 mcg over-the-counter daily.    Reviewed medications, potential side effects and signs and symptoms to report. Discussed risk versus benefits of treatment plan with patient and/or family-including medications, labs and radiology that may be ordered. Addressed questions and concerns during visit. Patient and/or family verbalized understanding and agree with plan.    AS THE PROVIDER, I PERSONALLY WORE PPE DURING ENTIRE FACE TO FACE ENCOUNTER IN CLINIC WITH THE PATIENT. PATIENT ALSO WORE PPE DURING ENTIRE FACE TO FACE ENCOUNTER EXCEPT FOR A MAX OF 30 SECONDS DURING NEUROLOGICAL EVALUATION OF CRANIAL NERVES AND THEN MASK WAS PLACED BACK OVER PATIENT FACE FOR REMAINDER OF VISIT. I WASHED MY HANDS BEFORE AND AFTER VISIT.    During this visit the following were done:  Labs Reviewed [x]    Labs Ordered []    Radiology Reports Reviewed [x]    Radiology Ordered [x]    PCP Records Reviewed []    Referring Provider Records Reviewed []    ER Records Reviewed []    Hospital Records Reviewed []    History Obtained From Family []    Radiology Images Reviewed []    Other Reviewed []    Records Requested [x] she is to obtain MRI disc from .    Ailyn Jackson, JG  12/6/2021

## 2021-12-09 ENCOUNTER — PRIOR AUTHORIZATION (OUTPATIENT)
Dept: NEUROLOGY | Facility: CLINIC | Age: 41
End: 2021-12-09

## 2021-12-15 ENCOUNTER — TELEPHONE (OUTPATIENT)
Dept: PHYSICAL THERAPY | Facility: CLINIC | Age: 41
End: 2021-12-15

## 2021-12-20 ENCOUNTER — TELEPHONE (OUTPATIENT)
Dept: PHYSICAL THERAPY | Facility: CLINIC | Age: 41
End: 2021-12-20

## 2022-01-03 ENCOUNTER — TREATMENT (OUTPATIENT)
Dept: PHYSICAL THERAPY | Facility: CLINIC | Age: 42
End: 2022-01-03

## 2022-01-03 DIAGNOSIS — M54.2 PAIN, NECK: ICD-10-CM

## 2022-01-03 DIAGNOSIS — M24.152 DEGENERATIVE TEAR OF ACETABULAR LABRUM OF LEFT HIP: ICD-10-CM

## 2022-01-03 DIAGNOSIS — G44.86 CERVICOGENIC HEADACHE: Primary | ICD-10-CM

## 2022-01-03 DIAGNOSIS — M84.374D STRESS FRACTURE OF RIGHT FOOT WITH ROUTINE HEALING, SUBSEQUENT ENCOUNTER: ICD-10-CM

## 2022-01-03 PROCEDURE — 97162 PT EVAL MOD COMPLEX 30 MIN: CPT | Performed by: PHYSICAL THERAPIST

## 2022-01-03 PROCEDURE — 97110 THERAPEUTIC EXERCISES: CPT | Performed by: PHYSICAL THERAPIST

## 2022-01-03 NOTE — PROGRESS NOTES
Physical Therapy Initial Evaluation and Plan of Care      Patient: Odalys Alaniz   : 1980  Diagnosis/ICD-10 Code:  Cervicogenic headache [G44.86]  Referring practitioner: JANNA Mosley*    Subjective Evaluation    History of Present Illness  Mechanism of injury: 1. Has had migraines since childhood but started picking up over this past summer. Went from having 2-3 yearly to weekly. Is now on a medicine that helps the migraines. She has been cleared for MS but is having neck tension.   Left side of head tends to be worse. Does get nauseas and vision changes.     2. Stress fracture in right 4th and 5th metatarsal in September, wore a boot for 6 weeks. Feels like she is walking more normally now but still limping some.     3. Left hip labral tear in last July, has had an injection. Did have PT for that.     PMH: cyst on pineal gland. Low blood pressure, orthostatic hypotension.       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  Location: bilateral neck, weakness in foot  Quality: dull ache, sharp and radiating  Aggravating factors: movement, lifting, sleeping, ambulation, squatting, overhead activity, outstretched reach, standing, stairs and prolonged positioning    Hand dominance: right    Patient Goals  Patient goals for therapy: decreased edema, decreased pain, improved balance, return to sport/leisure activities, independence with ADLs/IADLs, increased strength and increased motion  Patient goal: going back to strength training            Objective        Special Questions  Patient is experiencing disturbed sleep, headaches and nausea/motion sickness.       Postural Observations  Seated posture: fair  Standing posture: fair        Palpation   Left   Hypertonic in the scalenes and upper trapezius.   Tenderness of the levator scapulae, scalenes, suboccipitals and upper trapezius.     Right   Hypertonic in the  scalenes. Tenderness of the scalenes and suboccipitals.     Tenderness   Cervical Spine   Tenderness in the left 1st rib and right 1st rib.     Left Shoulder   No tenderness in the clavicle.     Right Shoulder  No tenderness in the clavicle.     Additional Tenderness Details  General soreness of mid cervical spine. Left mid cervical facet joint hypomobility compared to right without pain.     Active Range of Motion   Cervical/Thoracic Spine   Cervical    Left lateral flexion: 40 degrees   Right lateral flexion: 32 (tight on L ) degrees   Left rotation: 62 degrees   Right rotation: 58 (tight on L) degrees     Thoracic   Flexion: WFL  Extension: Active thoracic extension: moderately limited    Left rotation: Active left thoracic rotation: minimally limited    Right rotation: Active right thoracic rotation: minimally limited    Left Ankle/Foot   Normal active range of motion    Right Ankle/Foot   Normal active range of motion    Strength/Myotome Testing     Left Shoulder     Planes of Motion   Flexion: 4+   Abduction: 4+   External rotation at 0°: 4   Internal rotation at 0°: 5     Right Shoulder     Planes of Motion   Flexion: 4+   Abduction: 4+   External rotation at 0°: 4   Internal rotation at 0°: 5     Left Ankle/Foot   Normal strength    Right Ankle/Foot   Normal strength    Tests   Cervical     Left   Positive ULTT1 and ULTT2.   Negative Spurling's sign.     Right   Positive ULTT1 and ULTT2.   Negative Spurling's sign.     Ambulation     Quality of Movement During Gait     Additional Quality of Movement During Gait Details  Ankle instability apparent, arches maintained appropriately.     Functional Assessment     Single Leg Stance - Eyes Open   Left  Trial 1: 30 seconds    Right  Trial 1: 30 seconds    Single Leg Stance - Eyes Closed   Left  Trial 1: 7 seconds    Right  Trial 1: 7 seconds    Comments  SLS with eyes closed, mod-sev postural sway.           Assessment & Plan     Assessment  Impairments: abnormal  coordination, abnormal gait, abnormal muscle firing, abnormal muscle tone, abnormal or restricted ROM, activity intolerance, impaired balance, impaired physical strength, lacks appropriate home exercise program and pain with function  Functional Limitations: carrying objects, lifting, sleeping, walking, pulling, pushing, uncomfortable because of pain, moving in bed and standing  Assessment details: Patient is a 41 year old female presenting with migraines, cervicogenic headaches, neck tension, right foot/ankle instability following 4th and 5th metatarsal stress fractures last year, and history of left acetabular labral tear. Patient presents with mild restriction in left mid-upper cervical facet joints, gross muscle tension, scapular weakness, and loss of proprioception of right foot/ankle. She is appropriate for physical therapy to address these issues.     Barriers to therapy: multiple body parts  Prognosis: good  Prognosis details: Short Term Goals (2 weeks):  1. Patient will be independent with home exercise program.  2. Patient will demonstrate improved cervical mobility by 50%.  3. Patient will report reduction in headache frequency, duration and/or intensity by 25-50%    Long Term Goals (8 weeks):  1. Patient will be able to lift at least 5 lbs overhead without neck pain.   2. Patient will demonstrate cervical mobility of WFL.   3. Patient will report reduction in headache frequency, duration and/or intensity by 50-75%  4. Patient will be able to return to full work or home duties with neck pain no greater than 2/10.      Plan  Therapy options: will be seen for skilled therapy services  Planned modality interventions: ultrasound, thermotherapy (hydrocollator packs), TENS, high voltage pulsed current (spasm management), high voltage pulsed current (pain management) and cryotherapy  Planned therapy interventions: therapeutic activities, stretching, strengthening, spinal/joint mobilization, soft tissue  mobilization, postural training, neuromuscular re-education, motor coordination training, manual therapy, abdominal trunk stabilization, ADL retraining, joint mobilization, home exercise program, IADL retraining, gait training, functional ROM exercises, flexibility, fine motor coordination training, balance/weight-bearing training and body mechanics training  Frequency: 2x week  Duration in weeks: 8  Treatment plan discussed with: patient        Access Code: [email or web address]  URL: [email or web address]  Date: 01/03/2022  Prepared by: Eufemia Rausch    Exercises  Seated Assisted Cervical Rotation with Towel - 1 x daily - 7 x weekly - 1 sets - 10 reps - 5 hold  cervical extension snag with towel - 1 x daily - 7 x weekly - 1 sets - 10 reps - 5 hold  Cervical Retraction at Wall - 7 x weekly - 1 sets - 10 reps - 30 hold  Sub-Occipital Cervical Stretch - 1 x daily - 7 x weekly - 1 sets - 10 reps - 10 hold  Single Leg Stance - 1 x daily - 7 x weekly - 1 sets - 10 reps  Tandem Stance - 1 x daily - 7 x weekly - 1 sets - 10 reps      Manual Therapy:         mins  88962;  Therapeutic Exercise:    25     mins  14532;     Neuromuscular Luis:        mins  70285;    Therapeutic Activity:          mins  10480;     Gait Training:           mins  [id number];     Ultrasound:         mins  37431;    Electrical Stimulation:         mins  36931 ( );  Dry Needling          mins self-pay    Timed Treatment:   25   mins   Total Treatment:     53   mins    PT SIGNATURE: Eufemia Rausch, PT   DATE TREATMENT INITIATED: 1/3/2022    Initial Certification  Certification Period: 4/3/2022  I certify that the therapy services are furnished while this patient is under my care.  The services outlined above are required by this patient, and will be reviewed every 90 days.     PHYSICIAN: Ailyn Jackson, APRN      DATE:     Please sign and return via fax to [phone].. Thank you, [facility].

## 2022-01-04 ENCOUNTER — OFFICE VISIT (OUTPATIENT)
Dept: NEUROSURGERY | Facility: CLINIC | Age: 42
End: 2022-01-04

## 2022-01-04 VITALS — BODY MASS INDEX: 29.81 KG/M2 | TEMPERATURE: 97.5 F | WEIGHT: 174.6 LBS | HEIGHT: 64 IN

## 2022-01-04 DIAGNOSIS — M50.30 DDD (DEGENERATIVE DISC DISEASE), CERVICAL: ICD-10-CM

## 2022-01-04 DIAGNOSIS — E34.8 PINEAL GLAND CYST: Primary | ICD-10-CM

## 2022-01-04 DIAGNOSIS — G43.909 MIGRAINE SYNDROME: ICD-10-CM

## 2022-01-04 PROCEDURE — 99204 OFFICE O/P NEW MOD 45 MIN: CPT | Performed by: NEUROLOGICAL SURGERY

## 2022-01-04 RX ORDER — KETOCONAZOLE 20 MG/ML
SHAMPOO TOPICAL
COMMUNITY
Start: 2021-12-10

## 2022-01-04 NOTE — PROGRESS NOTES
Patient: Odalys Alaniz  : 1980    Primary Care Provider: Leigha Lopes MD    Requesting Provider: As above        History    Chief Complaint: Headache, sensory alteration in the arms, facial numbness, insomnia.    History of Present Illness: Ms. Alaniz is a 41-year-old unemployed woman, right-handed, who has had a number of the above symptoms for some time.  This last summer the symptoms seem to have increased.  She has no arm pain.  She is currently doing physical therapy for neck pain.  Her headaches are better with medication.    Review of Systems   Constitutional: Positive for chills and fatigue. Negative for activity change, appetite change, diaphoresis, fever and unexpected weight change.   HENT: Negative for congestion, dental problem, drooling, ear discharge, ear pain, facial swelling, hearing loss, mouth sores, nosebleeds, postnasal drip, rhinorrhea, sinus pressure, sinus pain, sneezing, sore throat, tinnitus, trouble swallowing and voice change.    Eyes: Positive for itching and visual disturbance. Negative for photophobia, pain, discharge and redness.   Respiratory: Negative for apnea, cough, choking, chest tightness, shortness of breath, wheezing and stridor.    Cardiovascular: Negative for chest pain, palpitations and leg swelling.   Gastrointestinal: Positive for nausea. Negative for abdominal distention, abdominal pain, anal bleeding, blood in stool, constipation, diarrhea, rectal pain and vomiting.   Endocrine: Positive for cold intolerance. Negative for heat intolerance, polydipsia, polyphagia and polyuria.   Genitourinary: Positive for urgency. Negative for decreased urine volume, difficulty urinating, dysuria, enuresis, flank pain, frequency, genital sores and hematuria.   Musculoskeletal: Positive for neck pain. Negative for arthralgias, back pain, gait problem, joint swelling, myalgias and neck stiffness.   Skin: Negative for color change, pallor, rash and wound.  "  Allergic/Immunologic: Negative for environmental allergies, food allergies and immunocompromised state.   Neurological: Positive for dizziness, tremors, speech difficulty, weakness, light-headedness and numbness. Negative for seizures, syncope, facial asymmetry and headaches.   Hematological: Negative for adenopathy. Does not bruise/bleed easily.   Psychiatric/Behavioral: Positive for agitation, confusion, decreased concentration and sleep disturbance. Negative for behavioral problems, dysphoric mood, hallucinations, self-injury and suicidal ideas. The patient is not nervous/anxious and is not hyperactive.        The patient's past medical history, past surgical history, family history, and social history have been reviewed at length in the electronic medical record.    Physical Exam:   Temp 97.5 °F (36.4 °C)   Ht 162.6 cm (64\")   Wt 79.2 kg (174 lb 9.6 oz)   BMI 29.97 kg/m²   CONSTITUTIONAL: Patient is well-nourished, pleasant and appears stated age.  PULMONARY: Lungs are clear to ascultation.  MUSCULOSKELETAL:  Neck tenderness to palpation is not observed.   ROM in the neck is normal.  NEUROLOGICAL:  Orientation, memory, attention span, language function, and cognition have been examined and are intact.  Strength is intact in the upper and lower extremities to direct testing.  Muscle tone is normal throughout.  Station and gait are normal.  Sensation is intact to light touch testing throughout.  Deep tendon reflexes are 2+ and symmetrical.  Luis's Sign is negative bilaterally. No clonus is elicited.  Coordination is intact.  CRANIAL NERVES:  Cranial Nerve II: Visual fields are full to confrontation.  Cranial Nerve III, IV, and VI: PERRLADC. Extraocular movements are intact.  Nystagmus is not present.  Cranial Nerve V: Facial sensation is intact to light touch.  Cranial Nerve VII: Muscles of facial expression demonstate no weakness or asymmetry.  Cranial Nerve VIII: Hearing is intact to finger rub " bilaterally.  Cranial Nerve IX and X: Palate elevates symmetrically.  Cranial Nerve XI: Shoulder shrug is intact bilaterally.  Cranial Nerve XII: Tongue is midline without evidence of atrophy or fasciculation.  Medical Decision Making    Data Review:   (All imaging studies were personally reviewed unless stated otherwise)  MRI of the brain dated 12/3/2021 demonstrates a slightly ring-enhancing cystic lesion in the pineal region.  There is no hydrocephalus.    MRI of the cervical spine from the same date demonstrates some mild loss of the normal lordosis with mild degenerative disc disease.  There is no cord or root compromise.    Diagnosis:   1.  Incidental pineal cyst.  2.  Mechanical neck pain.    Treatment Options:   The above-noted lesion is unlikely to be the source for her symptoms.  I have recommended follow-up MRI with and without gadolinium in 1 year.  I would agree with the physical therapy for her mechanical neck pain.  She will follow-up in 1 year with the above-noted study and her old studies for comparison.       Diagnosis Plan   1. Pineal gland cyst     2. Migraine syndrome     3. DDD (degenerative disc disease), cervical         Scribed for Cody Foster MD by Jeanne Alanis CMA on 1/4/2022 09:59 EST       I, Dr. Foster, personally performed the services described in the documentation, as scribed in my presence, and it is both accurate and complete.

## 2022-01-05 ENCOUNTER — DOCUMENTATION (OUTPATIENT)
Dept: NEUROSURGERY | Facility: CLINIC | Age: 42
End: 2022-01-05

## 2022-01-05 ENCOUNTER — TREATMENT (OUTPATIENT)
Dept: PHYSICAL THERAPY | Facility: CLINIC | Age: 42
End: 2022-01-05

## 2022-01-05 ENCOUNTER — TELEPHONE (OUTPATIENT)
Dept: NEUROLOGY | Facility: CLINIC | Age: 42
End: 2022-01-05

## 2022-01-05 DIAGNOSIS — G44.86 CERVICOGENIC HEADACHE: Primary | ICD-10-CM

## 2022-01-05 DIAGNOSIS — M54.2 PAIN, NECK: ICD-10-CM

## 2022-01-05 PROCEDURE — 97140 MANUAL THERAPY 1/> REGIONS: CPT | Performed by: PHYSICAL THERAPIST

## 2022-01-05 PROCEDURE — 97110 THERAPEUTIC EXERCISES: CPT | Performed by: PHYSICAL THERAPIST

## 2022-01-05 NOTE — PROGRESS NOTES
MR HEAD dated 2021-12-03  MR CSP dated 2021-12-03    Uploaded to PACS. Disc mailed back to pt's address on file.

## 2022-01-05 NOTE — PROGRESS NOTES
Physical Therapy Daily Progress Note        Patient: Odalys Alaniz   : 1980  Diagnosis/ICD-10 Code:  Cervicogenic headache [G44.86]  Referring practitioner: JANNA Mosley*  Date of Initial Visit: Type: THERAPY  Noted: 1/3/2022  Today's Date: 2022  Patient seen for 2 sessions             Subjective   Odalys Alaniz reports: noticing she gets N/T with pain down both arms but L>R when reaching out in front, such as, driving or laundry. The exercises have been helpful so far.     Objective   Trigger point L shoulder blade  T-spine hypomobile and tender L>R  See Exercise, Manual, and Modality Logs for complete treatment.       Assessment/Plan  Hypomobility at the t-spine could be contributing to stress at the lower cervical region. Pt responded well to manual mobilizations and activities to improve t-spine flexibility. Went over postural cues to help alignment in the car and at home when sitting.   Progress per Plan of Care and Progress strengthening /stabilization /functional activity           Timed:  Manual Therapy:    26     mins  77350;  Therapeutic Exercise:    31     mins  22855;     Neuromuscular Luis:        mins  85824;    Therapeutic Activity:          mins  19646;     Gait Training:           mins  33935;     Ultrasound:          mins  47995;    Electrical Stimulation:         mins  70024;  Iontophoresis          mins  62277    Untimed:  Electrical Stimulation:         mins  24383 ( );  Mechanical Traction:         mins  28748;   Fluidotherapy          mins  17002    Timed Treatment:   57   mins   Total Treatment:     57   mins        Alana Guzman PTA  Physical Therapist Assistant

## 2022-01-05 NOTE — TELEPHONE ENCOUNTER
----- Message from JG Mosley sent at 1/5/2022  8:27 AM EST -----  She saw Dr. Foster with neurosurgery and he reviewed her MRI brain and C spine yesterday. She has a small pineal gland cyst which he feels is incidental but recommended repeat scan in 1 year. MRI Cervical spine looks good overall. Recommended continuing physical therapy. No lesions. We will f/u as scheduled in office. No need to call patient. Just fax results to PCP. Thanks, JG Alegre

## 2022-01-10 ENCOUNTER — TREATMENT (OUTPATIENT)
Dept: PHYSICAL THERAPY | Facility: CLINIC | Age: 42
End: 2022-01-10

## 2022-01-10 DIAGNOSIS — G44.86 CERVICOGENIC HEADACHE: Primary | ICD-10-CM

## 2022-01-10 DIAGNOSIS — M54.2 PAIN, NECK: ICD-10-CM

## 2022-01-10 PROCEDURE — 97112 NEUROMUSCULAR REEDUCATION: CPT | Performed by: PHYSICAL THERAPIST

## 2022-01-10 PROCEDURE — 97140 MANUAL THERAPY 1/> REGIONS: CPT | Performed by: PHYSICAL THERAPIST

## 2022-01-10 PROCEDURE — 97110 THERAPEUTIC EXERCISES: CPT | Performed by: PHYSICAL THERAPIST

## 2022-01-10 NOTE — PROGRESS NOTES
Physical Therapy Daily Progress Note        Patient: Odalys Alaniz   : 1980  Diagnosis/ICD-10 Code:  Cervicogenic headache [G44.86]  Referring practitioner: JANNA Mosley*  Date of Initial Visit: Type: THERAPY  Noted: 1/3/2022  Today's Date: 1/10/2022  Patient seen for 3 sessions             Subjective   Odalys Alaniz reports: adjusting the car seat has been very helpful and not getting anything down the arm with driving. She still has radicular symptoms when reaching out for heavy things. Mobility has improved with new HEP. Discomfort today is directly on the left side of the upper t-spine, lower c-spine.     Objective   Rib mobility normal  Upper t-spine hypomobile and tender  Lower t-spine normal  See Exercise, Manual, and Modality Logs for complete treatment.       Assessment/Plan  Already seeing benefits from the progressions in treatment. Progressed strengthening as hopeful radicular symptoms and irritation will abolish with scapular stabilization of posterior chain.  Progress per Plan of Care and Progress strengthening /stabilization /functional activity           Timed:  Manual Therapy:    10     mins  90425;  Therapeutic Exercise:    33     mins  88549;     Neuromuscular Luis:    15    mins  75536;    Therapeutic Activity:          mins  25133;     Gait Training:           mins  37952;     Ultrasound:          mins  47861;    Electrical Stimulation:         mins  39125;  Iontophoresis          mins  86543    Untimed:  Electrical Stimulation:         mins  36939 ( );  Mechanical Traction:         mins  32657;   Fluidotherapy          mins  15482    Timed Treatment:   58   mins   Total Treatment:     58   mins        Alana Guzman PTA  Physical Therapist Assistant

## 2022-01-12 ENCOUNTER — TREATMENT (OUTPATIENT)
Dept: PHYSICAL THERAPY | Facility: CLINIC | Age: 42
End: 2022-01-12

## 2022-01-12 DIAGNOSIS — M24.152 DEGENERATIVE TEAR OF ACETABULAR LABRUM OF LEFT HIP: ICD-10-CM

## 2022-01-12 DIAGNOSIS — M54.2 PAIN, NECK: ICD-10-CM

## 2022-01-12 DIAGNOSIS — M84.374D STRESS FRACTURE OF RIGHT FOOT WITH ROUTINE HEALING, SUBSEQUENT ENCOUNTER: ICD-10-CM

## 2022-01-12 DIAGNOSIS — G44.86 CERVICOGENIC HEADACHE: Primary | ICD-10-CM

## 2022-01-12 PROCEDURE — 97140 MANUAL THERAPY 1/> REGIONS: CPT | Performed by: PHYSICAL THERAPIST

## 2022-01-12 PROCEDURE — 97530 THERAPEUTIC ACTIVITIES: CPT | Performed by: PHYSICAL THERAPIST

## 2022-01-12 PROCEDURE — 97110 THERAPEUTIC EXERCISES: CPT | Performed by: PHYSICAL THERAPIST

## 2022-01-12 NOTE — PROGRESS NOTES
Physical Therapy Daily Progress Note      Patient: Odalys Alaniz   : 1980  Diagnosis/ICD-10 Code:  Cervicogenic headache [G44.86]  Referring practitioner: JANNA Mosley*  Date of Initial Visit: Type: THERAPY  Noted: 1/3/2022  Today's Date: 2022  Patient seen for 4 sessions         Odalys Alaniz reports: still having headaches, no change. Still having radicular symptoms in arms when reaching out. Exercises felt good. Numbness going through triceps into last 2 fingers.   Could tell that her right ankle/foot was very unstable on the snow.         Objective   Palpation: tenderness/sharpness with s.p PA mob at around T4. No palpable trigger points in surrounding musculature.     (+) b/l ulnar nerve tension, tenderness at cubital tunnel.     See Exercise, Manual, and Modality Logs for complete treatment.       Assessment/Plan  Ulnar nerve tension, likely from upper t/s and lower c/s hypomobility and postural stress. Given additional HEP.   Progress per Plan of Care    Access Code: [email or web address]  URL: [email or web address]  Date: 2022  Prepared by: Eufemia Rausch    Exercises  Seated Thoracic Lumbar Extension - 1 x daily - 7 x weekly - 1 sets - 10 reps  Seated Thoracic Flexion and Rotation with Arms Crossed - 1 x daily - 7 x weekly - 1 sets - 10 reps  Quadruped Full Range Thoracic Rotation with Reach - 1 x daily - 7 x weekly - 1 sets - 10 reps  Prone Lower Trapezius Strengthening on Swiss Ball - 1 x daily - 4 x weekly - 3 sets - 10 reps  Supine Shoulder Horizontal Abduction with Resistance - 1 x daily - 4 x weekly - 3 sets - 10 reps  Standing Bilateral Low Shoulder Row with Anchored Resistance - 1 x daily - 4 x weekly - 3 sets - 10 reps  Shoulder External Rotation and Scapular Retraction with Resistance - 1 x daily - 4 x weekly - 3 sets - 10 reps  Lower Quarter Reach Combination - 1 x daily - 4 x weekly - 2 sets - 5 reps      Standing Ulnar Nerve Glide - 3 x daily - 7 x weekly - 1 sets - 10  reps  Ulnar Nerve Flossing - 3 x daily - 7 x weekly - 1 sets - 10 reps         Manual Therapy:    13     mins  55887;  Therapeutic Exercise:     35    mins  83940;     Neuromuscular Luis:        mins  64823;    Therapeutic Activity:    15      mins  08938;     Gait Training:          mins  11607;     Ultrasound:          mins  93130;    Electrical Stimulation:         mins  02919 ( );  Dry Needling          mins self-pay    Timed Treatment:   63   mins   Total Treatment:     63   mins    Eufemia Rausch, PT  Physical Therapist

## 2022-11-14 ENCOUNTER — PRIOR AUTHORIZATION (OUTPATIENT)
Dept: NEUROLOGY | Facility: CLINIC | Age: 42
End: 2022-11-14

## 2022-12-13 ENCOUNTER — TELEPHONE (OUTPATIENT)
Dept: NEUROSURGERY | Facility: CLINIC | Age: 42
End: 2022-12-13

## 2022-12-13 DIAGNOSIS — E34.8 PINEAL GLAND CYST: Primary | ICD-10-CM

## 2022-12-13 NOTE — TELEPHONE ENCOUNTER
Caller: Odalys Alaniz    Relationship: Self    Best call back number: [phone]    What orders are you requesting (i.e. lab or imaging): MRI BRAIN W & WO    In what timeframe would the patient need to come in: 2/15/23    Where will you receive your lab/imaging services: [facility]    Additional notes: PATIENT HAS HAD TO RESCHEDULE MRI & F/U WITH DR YANCEY DUE TO UPCOMING HIP SX. HOWEVER, MRI ORDER EXPIRES 1/4/23, SO A NEW ORDER WILL NEED TO BE PLACED. PLEASE ALSO CALL PATIENT TO ADVISE.

## 2022-12-14 NOTE — TELEPHONE ENCOUNTER
MRI is R/S for 02/13/23 @2:30pm. Called and spoke w/ Pt and let her know about the appt and she is thankful for it. Mailed appt reminder as well.

## 2022-12-27 ENCOUNTER — APPOINTMENT (OUTPATIENT)
Dept: MRI IMAGING | Facility: HOSPITAL | Age: 42
End: 2022-12-27

## 2023-02-22 ENCOUNTER — TRANSCRIBE ORDERS (OUTPATIENT)
Dept: LAB | Facility: HOSPITAL | Age: 43
End: 2023-02-22
Payer: COMMERCIAL

## 2023-02-22 ENCOUNTER — LAB (OUTPATIENT)
Dept: LAB | Facility: HOSPITAL | Age: 43
End: 2023-02-22
Payer: COMMERCIAL

## 2023-02-22 DIAGNOSIS — R63.5 ABNORMAL WEIGHT GAIN: Primary | ICD-10-CM

## 2023-02-22 DIAGNOSIS — R63.5 ABNORMAL WEIGHT GAIN: ICD-10-CM

## 2023-02-22 LAB
25(OH)D3 SERPL-MCNC: 25.6 NG/ML (ref 30–100)
CHOLEST SERPL-MCNC: 154 MG/DL (ref 0–200)
HBA1C MFR BLD: 5.2 % (ref 4.8–5.6)
HDLC SERPL QL: 3.67
HDLC SERPL-MCNC: 42 MG/DL (ref 40–60)
LDLC SERPL CALC-MCNC: 82 MG/DL (ref 0–100)
T3 SERPL-MCNC: 96.3 NG/DL (ref 80–200)
T3FREE SERPL-MCNC: 2.68 PG/ML (ref 2–4.4)
TRIGL SERPL-MCNC: 173 MG/DL (ref 0–150)
TSH SERPL DL<=0.05 MIU/L-ACNC: 1.44 UIU/ML (ref 0.27–4.2)
VIT B12 BLD-MCNC: 264 PG/ML (ref 211–946)
VLDLC SERPL-MCNC: 30 MG/DL (ref 5–40)

## 2023-02-22 PROCEDURE — 82607 VITAMIN B-12: CPT

## 2023-02-22 PROCEDURE — 86376 MICROSOMAL ANTIBODY EACH: CPT

## 2023-02-22 PROCEDURE — 36415 COLL VENOUS BLD VENIPUNCTURE: CPT

## 2023-02-22 PROCEDURE — 83036 HEMOGLOBIN GLYCOSYLATED A1C: CPT

## 2023-02-22 PROCEDURE — 82306 VITAMIN D 25 HYDROXY: CPT

## 2023-02-22 PROCEDURE — 84443 ASSAY THYROID STIM HORMONE: CPT

## 2023-02-22 PROCEDURE — 83525 ASSAY OF INSULIN: CPT

## 2023-02-22 PROCEDURE — 80061 LIPID PANEL: CPT

## 2023-02-22 PROCEDURE — 84481 FREE ASSAY (FT-3): CPT

## 2023-02-23 LAB — THYROPEROXIDASE AB SERPL-ACNC: <9 IU/ML (ref 0–34)

## 2023-03-03 LAB — INSULIN SERPL-ACNC: 26 UIU/ML

## 2023-03-09 ENCOUNTER — HOSPITAL ENCOUNTER (OUTPATIENT)
Dept: GENERAL RADIOLOGY | Facility: HOSPITAL | Age: 43
Discharge: HOME | End: 2023-03-09
Admitting: NURSE PRACTITIONER
Payer: COMMERCIAL

## 2023-03-09 ENCOUNTER — TRANSCRIBE ORDERS (OUTPATIENT)
Dept: ADMINISTRATIVE | Facility: HOSPITAL | Age: 43
End: 2023-03-09
Payer: COMMERCIAL

## 2023-03-09 DIAGNOSIS — R07.89 CHEST PAIN, ATYPICAL: Primary | ICD-10-CM

## 2023-03-09 PROCEDURE — 71046 X-RAY EXAM CHEST 2 VIEWS: CPT

## 2023-04-06 ENCOUNTER — OFFICE VISIT (OUTPATIENT)
Dept: PULMONOLOGY | Facility: CLINIC | Age: 43
End: 2023-04-06
Payer: COMMERCIAL

## 2023-04-06 VITALS
HEIGHT: 64 IN | SYSTOLIC BLOOD PRESSURE: 118 MMHG | WEIGHT: 195.6 LBS | DIASTOLIC BLOOD PRESSURE: 80 MMHG | BODY MASS INDEX: 33.39 KG/M2 | OXYGEN SATURATION: 98 % | TEMPERATURE: 98.4 F | HEART RATE: 88 BPM

## 2023-04-06 DIAGNOSIS — J30.2 SEASONAL ALLERGIES: ICD-10-CM

## 2023-04-06 DIAGNOSIS — U09.9 POST-COVID CHRONIC COUGH: ICD-10-CM

## 2023-04-06 DIAGNOSIS — R05.2 SUBACUTE COUGH: Primary | ICD-10-CM

## 2023-04-06 DIAGNOSIS — R05.3 POST-COVID CHRONIC COUGH: ICD-10-CM

## 2023-04-06 PROCEDURE — 94726 PLETHYSMOGRAPHY LUNG VOLUMES: CPT | Performed by: INTERNAL MEDICINE

## 2023-04-06 PROCEDURE — 94060 EVALUATION OF WHEEZING: CPT | Performed by: INTERNAL MEDICINE

## 2023-04-06 PROCEDURE — 99204 OFFICE O/P NEW MOD 45 MIN: CPT | Performed by: INTERNAL MEDICINE

## 2023-04-06 PROCEDURE — 94729 DIFFUSING CAPACITY: CPT | Performed by: INTERNAL MEDICINE

## 2023-04-06 RX ORDER — ALBUTEROL SULFATE 90 UG/1
AEROSOL, METERED RESPIRATORY (INHALATION) SEE ADMIN INSTRUCTIONS
COMMUNITY
Start: 2023-03-14

## 2023-04-06 RX ORDER — ERGOCALCIFEROL 1.25 MG/1
CAPSULE ORAL
COMMUNITY
Start: 2023-02-23

## 2023-04-06 RX ORDER — TOPIRAMATE 25 MG/1
25 TABLET ORAL
COMMUNITY
Start: 2023-02-22

## 2023-04-06 RX ORDER — PHENTERMINE HYDROCHLORIDE 15 MG/1
1 CAPSULE ORAL DAILY
COMMUNITY
Start: 2023-02-22

## 2023-04-06 RX ORDER — LEVALBUTEROL TARTRATE 45 UG/1
3 AEROSOL, METERED ORAL ONCE
Status: COMPLETED | OUTPATIENT
Start: 2023-04-06 | End: 2023-04-06

## 2023-04-06 RX ORDER — BENZONATATE 100 MG/1
100 CAPSULE ORAL 3 TIMES DAILY PRN
COMMUNITY
Start: 2023-03-22

## 2023-04-06 RX ORDER — MONTELUKAST SODIUM 10 MG/1
TABLET ORAL
COMMUNITY
Start: 2023-03-22

## 2023-04-06 RX ORDER — ACETAMINOPHEN 500 MG
1000 TABLET ORAL EVERY 6 HOURS PRN
COMMUNITY
Start: 2022-12-23

## 2023-04-06 RX ORDER — METFORMIN HYDROCHLORIDE 500 MG/1
TABLET, EXTENDED RELEASE ORAL
COMMUNITY
Start: 2023-03-20

## 2023-04-06 RX ADMIN — LEVALBUTEROL TARTRATE 3 PUFF: 45 AEROSOL, METERED ORAL at 08:46

## 2023-04-06 NOTE — PROGRESS NOTES
New Pulmonary Patient Office Visit      Patient Name: Odalys Alaniz    Referring Physician: Leigha Gamez AP*    Chief Complaint:    Chief Complaint   Patient presents with   • Cough     F/u        History of Present Illness: Odalys Alaniz is a 42 y.o. female who is here today to establish care with Pulmonary.     42-year-old female with past medical history of migraines, binge eating disorder, seasonal allergies referred here for evaluation for persistent cough post COVID-19 infection.  Patient states that she got COVID-19 infection in February 24.  She did not feel any significant respiratory symptoms but was feeling fatigued and tired and body aches.  She did not require any COVID-specific treatment such as Paxlovid or steroids initially.  Patient's  also had COVID-19 infection and he recovered well.  Patient states that after 2 weeks she was feeling better but then she started developing cough symptoms and initially was treated with antibiotics and steroids along with albuterol inhaler.  Her cough improved significantly with the use of steroids and after she was done with the steroids cough came back and now it is present all the time and intermittently.  There are no aggravating or relieving factors.  Sometimes bothers her during sleep as well.  Patient has also noticed that she is getting more short of breath with activity and especially when she is coughing. Occasional sputum production.  No hemoptysis.  No fevers, chills or night sweats.  No overt wheezing.  She feels tightness in her chest and it gets worse when she coughs.  She uses albuterol inhaler and also Tessalon Perles and after 20 to 30 minutes the cough dissipates.  She has been using her albuterol inhaler 2-3 times a day.  She does feel nasal congestion but denies any significant postnasal drip.  No nosebleeds.  Has been using Flonase nasal spray daily  and sometimes [place] pot nasal rinse.  She denies any heartburn or reflux  symptoms.  Patient was also placed on Singulair during this episode which she is taking nightly.  Patient states that some days she feels better and it can last couple of days and then the cough comes back and although symptoms come back again.  She has not made a connection if weather and humidity playing a role in her worsening symptoms.  Denies any skin rashes.  Denies any joint pains or joint stiffness.  No change in appetite or unexpected weight loss.  She has binge eating disorder and was started on Topamax and phentermine which she has not started yet.  Denies any other sick contacts.    No occupational exposures.  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  She is around horses quite a bit but did not have any pulses in their house.  They do have a cat in the house.  No black mold exposure.  No down comforters or pillows.      Subjective      Review of Systems:   Review of Systems   Constitutional: Positive for fatigue and unexpected weight gain.   HENT: Positive for congestion.    Respiratory: Positive for cough, chest tightness and shortness of breath.    Cardiovascular: Negative.    Gastrointestinal: Negative.    Endocrine: Negative.    Musculoskeletal: Negative.    Skin: Negative.    Neurological: Positive for headache.   Hematological: Negative.    Psychiatric/Behavioral: Negative.    All other systems reviewed and are negative.      Past Medical History:   Past Medical History:   Diagnosis Date   • Arrhythmia    • Bronchitis    • Difficulty walking    • Dyspnea    • Headache, tension-type    • Migraine    • Pneumonia    • Syncope        Past Surgical History:   Past Surgical History:   Procedure Laterality Date   • CYST REMOVAL     • HIP SURGERY  12/23/2022   • LYMPHADENECTOMY  2003       Family History:   Family History   Problem Relation Age of Onset   • Hypertension Mother    • Skin cancer Mother    • Melanoma Mother    • Migraines Mother    • Cancer Mother    • Hypertension Father    • Seizures Father    • Diabetes  Maternal Grandmother        Social History:   Social History     Socioeconomic History   • Marital status:    Tobacco Use   • Smoking status: Never   • Smokeless tobacco: Never   Vaping Use   • Vaping Use: Never used   Substance and Sexual Activity   • Alcohol use: Yes     Alcohol/week: 2.0 standard drinks     Types: 2 Glasses of wine per week     Comment: occas   • Drug use: No   • Sexual activity: Yes       Medications:     Current Outpatient Medications:   •  acetaminophen (TYLENOL) 500 MG tablet, Take 2 tablets by mouth Every 6 (Six) Hours As Needed., Disp: , Rfl:   •  albuterol sulfate  (90 Base) MCG/ACT inhaler, Inhale See Admin Instructions. Inhale 2 puffs by mouth every 4 to 6 hours as needed, Disp: , Rfl:   •  benzonatate (TESSALON) 100 MG capsule, Take 1 capsule by mouth 3 (Three) Times a Day As Needed., Disp: , Rfl:   •  butalbital-acetaminophen-caffeine (FIORICET, ESGIC) -40 MG per tablet, Take 1 tablet by mouth 4 (Four) Times a Day As Needed., Disp: , Rfl:   •  cyclobenzaprine (FLEXERIL) 10 MG tablet, Take 1 tablet by mouth 3 (Three) Times a Day As Needed., Disp: , Rfl:   •  ketoconazole (NIZORAL) 2 % shampoo, , Disp: , Rfl:   •  levonorgestrel (MIRENA) 20 MCG/24HR IUD, 1 each by Intrauterine route 1 (One) Time., Disp: , Rfl:   •  metFORMIN ER (GLUCOPHAGE-XR) 500 MG 24 hr tablet, TAKE 1 TABLET BY MOUTH EVERY DAY WITH THE LAST MEAL OF THE DAY, Disp: , Rfl:   •  montelukast (SINGULAIR) 10 MG tablet, , Disp: , Rfl:   •  phentermine 15 MG capsule, Take 1 capsule by mouth Daily., Disp: , Rfl:   •  promethazine (PHENERGAN) 25 MG tablet, Take 1 tablet by mouth Every 6 (Six) Hours As Needed for Nausea. for nausea, Disp: 20 tablet, Rfl: 2  •  topiramate (TOPAMAX) 25 MG tablet, Take 1 tablet by mouth Every Morning Before Breakfast., Disp: , Rfl:   •  topiramate (TOPAMAX) 50 MG tablet, Take 1 tablet by mouth 2 (Two) Times a Day., Disp: 60 tablet, Rfl: 5  •  ubrogepant 100 MG tablet, Take 1  "tablet at onset of migraine, may repeat once in 2 hours if needed, Disp: 10 tablet, Rfl: 5  •  vitamin D (ERGOCALCIFEROL) 1.25 MG (25363 UT) capsule capsule, TAKE 1 CAPSULE BY MOUTH WEEKLY FOR 12 WEEKS, Disp: , Rfl:   No current facility-administered medications for this visit.    Allergies:   No Known Allergies    Objective     Physical Exam:  Vital Signs:   Vitals:    04/06/23 0844   BP: 118/80   BP Location: Left arm   Patient Position: Sitting   Cuff Size: Adult   Pulse: 88   Temp: 98.4 °F (36.9 °C)   TempSrc: Infrared   SpO2: 98%  Comment: resting room air   Weight: 88.7 kg (195 lb 9.6 oz)   Height: 162.6 cm (64\")       Physical Exam  Vitals and nursing note reviewed.   Constitutional:       General: She is not in acute distress.     Appearance: She is well-developed. She is not diaphoretic.   HENT:      Head: Normocephalic and atraumatic.      Comments: Mallampati 2 airway     Nose:      Comments: Bilateral nasal turbinate bogginess     Mouth/Throat:      Pharynx: No oropharyngeal exudate.   Eyes:      General:         Right eye: No discharge.         Left eye: No discharge.      Pupils: Pupils are equal, round, and reactive to light.   Neck:      Thyroid: No thyromegaly.      Trachea: No tracheal deviation.   Cardiovascular:      Rate and Rhythm: Normal rate and regular rhythm.      Heart sounds: Normal heart sounds. No murmur heard.    No friction rub. No gallop.   Pulmonary:      Effort: Pulmonary effort is normal. No respiratory distress.      Breath sounds: No wheezing or rales.      Comments: Slightly prolonged exp phase  Musculoskeletal:         General: No tenderness.      Cervical back: Neck supple.      Right lower leg: No edema.      Left lower leg: No edema.      Comments: Clubbing: none   Neurological:      Mental Status: She is alert and oriented to person, place, and time.   Psychiatric:         Behavior: Behavior normal.         Thought Content: Thought content normal.         Judgment: " Judgment normal.         Results Review:   I reviewed the patient's new clinical results.    X-ray done last month reviewed and shows normal study.  Normal cardiac silhouette and pulmonary vasculature.  No interstitial changes noted.  No consolidation or pleural effusions noted either.    PFT IMPRESSION:   1. Available data suggests normal spirometry.    2. No significant bronchodilator response, but this does not preclude their clinical use.  3. Lung volume reveals mild hyperinflation.       4. Airway resistance is normal..  5. DLCO is normal.  Assessment / Plan      Assessment:   Problem List Items Addressed This Visit    None  Visit Diagnoses     Subacute cough    -  Primary    Relevant Medications    levalbuterol (XOPENEX HFA) inhaler 3 puff (Completed)    Other Relevant Orders    Pulmonary Function Test (Completed)    Post-COVID chronic cough        Seasonal allergies              Plan:   1.  Patient presenting with subacute cough which appears to be postviral bronchitis.  Some cases of post-COVID can have cough lasting for longer duration.  Steroids apparently helped but after weaning of steroids cough came back again which again appears to be inflammatory response with bronchitis.  She may be more prone to developing bronchitis due to seasonal allergies.  PFTs are not suggestive of any asthmatic physiology.  She does have mild hyperinflation which could be from bronchial inflammation as well.  Imaging does not show any other untoward findings.  Chest exam is clear.  Discussed with patient that I would like to treat her with inhaled steroid for few weeks to see if we can help control the inflammation and help correct the cough and shortness of breath.  I did provide her Asmanex inhaler.  Her technique of using the inhaler is correct and I was able to provide her spacer so that she can get better drug delivery.  Advised to rinse mouth after to reduce the risk of thrush.  Advised to take 2 sprays twice a day.   Hopefully she will have to use albuterol inhaler last with that.  If if it works then she will continue it for about 4 weeks and then we will try to get her off of it.  If cough reoccurs then we will call in a prescription otherwise we will keep her off any inhalers at that point.  If cough does not improve then she will let me know and we will proceed with more testing.    2.  Counseled on correct use of Flonase nasal spray.  Advised to continue using [place] pot as needed as well.  Discussed that significant nasal congestion and postnasal drip can play a role in exacerbating cough symptoms as well.    I will follow her in 4 months or earlier as needed.  Patient had no further questions at this time.  Thank you for allowing me to participate in care of this patient    Follow Up:   4 months or earlier if needed.     Discussed plan of care in detail with patient today. Patient verbally understands and agrees. I spent 45 minutes on this date of service. This time includes time spent by me in the following activities:preparing for the visit, reviewing tests, obtaining and/or reviewing a separately obtained history, performing a medically appropriate examination, counseling the patient, ordering medications, tests, or procedures, and/or documenting information in the medical record. This time excludes other separate billable services such as interpretation of tests or procedures, if applicable.        Charles Burks MD  Pulmonary Critical Care and Sleep Medicine

## 2023-04-24 ENCOUNTER — TELEPHONE (OUTPATIENT)
Dept: PULMONOLOGY | Facility: CLINIC | Age: 43
End: 2023-04-24
Payer: COMMERCIAL

## 2023-04-24 NOTE — TELEPHONE ENCOUNTER
Patient called stating her snoring has increased significantly to the point that not only is she waking her family up at night, she's waking herself up throughout the night as well. She is interested in having a sleep study performed. -- Please advise if you think this is appropriate.

## 2023-05-19 ENCOUNTER — LAB (OUTPATIENT)
Dept: LAB | Facility: HOSPITAL | Age: 43
End: 2023-05-19
Payer: COMMERCIAL

## 2023-05-19 ENCOUNTER — TRANSCRIBE ORDERS (OUTPATIENT)
Dept: LAB | Facility: HOSPITAL | Age: 43
End: 2023-05-19
Payer: COMMERCIAL

## 2023-05-19 DIAGNOSIS — R63.5 ABNORMAL WEIGHT GAIN: ICD-10-CM

## 2023-05-19 DIAGNOSIS — R79.89 HYPOURICEMIA: ICD-10-CM

## 2023-05-19 DIAGNOSIS — E53.8 BIOTIN-(PROPIONYL-COA-CARBOXYLASE) LIGASE DEFICIENCY: ICD-10-CM

## 2023-05-19 DIAGNOSIS — E53.8 BIOTIN-(PROPIONYL-COA-CARBOXYLASE) LIGASE DEFICIENCY: Primary | ICD-10-CM

## 2023-05-19 LAB
25(OH)D3 SERPL-MCNC: 36.6 NG/ML (ref 30–100)
VIT B12 BLD-MCNC: 251 PG/ML (ref 211–946)

## 2023-05-19 PROCEDURE — 36415 COLL VENOUS BLD VENIPUNCTURE: CPT

## 2023-05-19 PROCEDURE — 82607 VITAMIN B-12: CPT

## 2023-05-19 PROCEDURE — 83525 ASSAY OF INSULIN: CPT

## 2023-05-19 PROCEDURE — 82306 VITAMIN D 25 HYDROXY: CPT

## 2023-05-26 LAB — INSULIN SERPL-ACNC: 5.8 UIU/ML

## 2023-07-20 ENCOUNTER — TRANSCRIBE ORDERS (OUTPATIENT)
Dept: ADMINISTRATIVE | Facility: HOSPITAL | Age: 43
End: 2023-07-20
Payer: COMMERCIAL

## 2023-07-20 DIAGNOSIS — Z12.31 VISIT FOR SCREENING MAMMOGRAM: Primary | ICD-10-CM

## 2023-08-07 ENCOUNTER — OFFICE VISIT (OUTPATIENT)
Dept: PULMONOLOGY | Facility: CLINIC | Age: 43
End: 2023-08-07
Payer: COMMERCIAL

## 2023-08-07 VITALS
DIASTOLIC BLOOD PRESSURE: 66 MMHG | HEART RATE: 105 BPM | OXYGEN SATURATION: 99 % | TEMPERATURE: 98.2 F | BODY MASS INDEX: 31.41 KG/M2 | SYSTOLIC BLOOD PRESSURE: 96 MMHG | HEIGHT: 64 IN | WEIGHT: 184 LBS

## 2023-08-07 DIAGNOSIS — J30.9 ALLERGIC RHINITIS, UNSPECIFIED SEASONALITY, UNSPECIFIED TRIGGER: ICD-10-CM

## 2023-08-07 DIAGNOSIS — R05.9 COUGH, UNSPECIFIED TYPE: Primary | ICD-10-CM

## 2023-08-07 PROCEDURE — 99213 OFFICE O/P EST LOW 20 MIN: CPT | Performed by: NURSE PRACTITIONER

## 2023-08-07 RX ORDER — FLUTICASONE PROPIONATE 110 UG/1
2 AEROSOL, METERED RESPIRATORY (INHALATION)
Qty: 12 G | Refills: 5 | Status: SHIPPED | OUTPATIENT
Start: 2023-08-07

## 2023-08-07 NOTE — PROGRESS NOTES
[facility] Pulmonary Follow up    CHIEF COMPLAINT    Cough    HISTORY OF PRESENT ILLNESS    Odalys Alaniz is a 42 y.o.female here today for follow-up.  She was last seen in the office by Dr. Burks in April.  She denies any respiratory illnesses since her last appointment.    She states that her cough has improved significantly since her last appointment.  She did use the Asmanex occasionally after her last appointment and this resolved her cough.  She would like to have a prescription for an inhaler to have during the season changes.    She states that her cough is worse with sinus drainage.  She does continue to take the Singulair nightly.    She was concerned that she may have sleep apnea but noticed when she started using a [place] pot that her sleeping improved at nighttime.  She denies any daytime somnolence.  She does feel well rested in the mornings.    She denies any morning headaches.  She denies any chest pain or palpitations.  She denies any sputum production or hemoptysis.  She denies any lower extremity edema or calf tenderness.    She is a lifetime non-smoker.    Her [place] Sleepiness Scale is 2/24  Patient Active Problem List   Diagnosis    Syncope    Chest pain    Palpitations    Paresthesia    Left leg weakness    Intractable migraine with aura without status migrainosus    Cervicalgia    Pineal gland cyst    Allergic rhinitis       No Known Allergies    Current Outpatient Medications:     albuterol sulfate  (90 Base) MCG/ACT inhaler, Inhale See Admin Instructions. Inhale 2 puffs by mouth every 4 to 6 hours as needed, Disp: , Rfl:     ketoconazole (NIZORAL) 2 % shampoo, , Disp: , Rfl:     levonorgestrel (MIRENA) 20 MCG/24HR IUD, 1 each by Intrauterine route 1 (One) Time., Disp: , Rfl:     metFORMIN ER (GLUCOPHAGE-XR) 500 MG 24 hr tablet, TAKE 1 TABLET BY MOUTH EVERY DAY WITH THE LAST MEAL OF THE DAY, Disp: , Rfl:     montelukast (SINGULAIR) 10 MG tablet, , Disp: , Rfl:     phentermine 15 MG  capsule, Take 1 capsule by mouth Daily., Disp: , Rfl:     promethazine (PHENERGAN) 25 MG tablet, Take 1 tablet by mouth Every 6 (Six) Hours As Needed for Nausea. for nausea, Disp: 20 tablet, Rfl: 2    topiramate (TOPAMAX) 50 MG tablet, Take 1 tablet by mouth 2 (Two) Times a Day., Disp: 60 tablet, Rfl: 5    ubrogepant 100 MG tablet, Take 1 tablet at onset of migraine, may repeat once in 2 hours if needed, Disp: 10 tablet, Rfl: 5    vitamin D (ERGOCALCIFEROL) 1.25 MG (31650 UT) capsule capsule, TAKE 1 CAPSULE BY MOUTH WEEKLY FOR 12 WEEKS, Disp: , Rfl:     fluticasone (FLOVENT HFA) 110 MCG/ACT inhaler, Inhale 2 puffs 2 (Two) Times a Day., Disp: 12 g, Rfl: 5  MEDICATION LIST AND ALLERGIES REVIEWED.    Social History     Tobacco Use    Smoking status: Never    Smokeless tobacco: Never   Vaping Use    Vaping Use: Never used   Substance Use Topics    Alcohol use: Yes     Alcohol/week: 2.0 standard drinks     Types: 2 Glasses of wine per week     Comment: occas    Drug use: No       FAMILY AND SOCIAL HISTORY REVIEWED.    Review of Systems   Constitutional:  Positive for fatigue. Negative for activity change, appetite change, fever and unexpected weight change.   HENT:  Negative for congestion, postnasal drip, rhinorrhea, sinus pressure, sore throat and voice change.    Eyes:  Negative for visual disturbance.   Respiratory:  Negative for cough, chest tightness, shortness of breath and wheezing.    Cardiovascular:  Negative for chest pain, palpitations and leg swelling.   Gastrointestinal:  Negative for abdominal distention, abdominal pain, nausea and vomiting.   Endocrine: Negative for cold intolerance and heat intolerance.   Genitourinary:  Negative for difficulty urinating and urgency.   Musculoskeletal:  Negative for arthralgias, back pain and neck pain.   Skin:  Negative for color change and pallor.   Allergic/Immunologic: Negative for environmental allergies and food allergies.   Neurological:  Negative for dizziness,  "syncope, weakness and light-headedness.   Hematological:  Negative for adenopathy. Does not bruise/bleed easily.   Psychiatric/Behavioral:  Negative for agitation and behavioral problems.  .    BP 96/66 (BP Location: Left arm, Patient Position: Sitting, Cuff Size: Adult)   Pulse 105   Temp 98.2 øF (36.8 øC)   Ht 162.6 cm (64\")   Wt 83.5 kg (184 lb)   SpO2 99% Comment: Room air at rest  BMI 31.58 kg/mý     Immunization History   Administered Date(s) Administered    COVID-19 (PFIZER) Purple Cap Monovalent 04/06/2021, 04/30/2021, 11/12/2021    FluLaval/Fluzone >6mos 10/19/2020       Physical Exam  Vitals and nursing note reviewed.   Constitutional:       Appearance: She is well-developed. She is not diaphoretic.   HENT:      Head: Normocephalic and atraumatic.   Eyes:      Pupils: Pupils are equal, round, and reactive to light.   Neck:      Thyroid: No thyromegaly.   Cardiovascular:      Rate and Rhythm: Normal rate and regular rhythm.      Heart sounds: Normal heart sounds. No murmur heard.    No friction rub. No gallop.   Pulmonary:      Effort: Pulmonary effort is normal. No respiratory distress.      Breath sounds: Normal breath sounds. No wheezing or rales.   Chest:      Chest wall: No tenderness.   Abdominal:      General: Bowel sounds are normal.      Palpations: Abdomen is soft.      Tenderness: There is no abdominal tenderness.   Musculoskeletal:         General: No swelling. Normal range of motion.      Cervical back: Normal range of motion and neck supple.   Lymphadenopathy:      Cervical: No cervical adenopathy.   Skin:     General: Skin is warm and dry.      Capillary Refill: Capillary refill takes less than 2 seconds.   Neurological:      Mental Status: She is alert and oriented to person, place, and time.   Psychiatric:         Mood and Affect: Mood normal.         Behavior: Behavior normal.         RESULTS    PROBLEM LIST    Problem List Items Addressed This Visit          Allergies and Adverse " Reactions    Allergic rhinitis     Other Visit Diagnoses       Cough, unspecified type    -  Primary    Relevant Medications    fluticasone (FLOVENT HFA) 110 MCG/ACT inhaler              DISCUSSION    Ms. Alaniz was here for follow-up of her cough.  She states that her cough has much improved since her last appointment.  I will give her a prescription for Flovent to use as needed when her cough is worse during the season changes.    We did discuss obtaining a home sleep study but she does not meet qualifications for a sleep study at this time.    She will continue to use singulair nightly for her allergies.    She states that her cough has improved.  We will have her follow-up as needed.    I personally spent a total of 26 minutes on patient visit today including chart review, face to face with the patient obtaining the history and physical exam, review of pertinent images and tests, counseling and discussion and/or coordination of care as described above, and documentation.  Total time excludes time spent on other separate services such as performing procedures or test interpretation, if applicable.        Karly Hernandez, JG  08/07/202311:25 EDT  Electronically signed     Please note that portions of this note were completed with a voice recognition program.        CC: Leigha Lopes MD

## 2023-12-05 ENCOUNTER — TRANSCRIBE ORDERS (OUTPATIENT)
Dept: ADMINISTRATIVE | Facility: HOSPITAL | Age: 43
End: 2023-12-05
Payer: COMMERCIAL

## 2023-12-05 DIAGNOSIS — E04.9 ENLARGED THYROID: Primary | ICD-10-CM

## 2024-01-08 ENCOUNTER — HOSPITAL ENCOUNTER (OUTPATIENT)
Dept: ULTRASOUND IMAGING | Facility: HOSPITAL | Age: 44
Discharge: HOME | End: 2024-01-08
Payer: COMMERCIAL

## 2024-01-08 DIAGNOSIS — E04.9 ENLARGED THYROID: ICD-10-CM

## 2024-01-08 PROCEDURE — 76536 US EXAM OF HEAD AND NECK: CPT

## 2024-01-27 ENCOUNTER — HOSPITAL ENCOUNTER (OUTPATIENT)
Dept: MAMMOGRAPHY | Facility: HOSPITAL | Age: 44
Discharge: HOME | End: 2024-01-27
Admitting: OBSTETRICS & GYNECOLOGY
Payer: COMMERCIAL

## 2024-01-27 DIAGNOSIS — Z12.31 VISIT FOR SCREENING MAMMOGRAM: ICD-10-CM

## 2024-01-27 PROCEDURE — 77063 BREAST TOMOSYNTHESIS BI: CPT

## 2024-01-27 PROCEDURE — 77067 SCR MAMMO BI INCL CAD: CPT

## 2024-05-16 NOTE — PROGRESS NOTES
Chief complaint/Reason for consult: Thyroid nodules    Consult requested by Leigha Lopes MD    HPI: Patient is a 43year old female here for evaluation of a thyroid nodule, hyperparathyroidism and possibly abnormal thyroid antibodies.     # Thyroid nodule:   - First noted 2023 based on abnormal physical exam  - Last thyroid ultrasound: Personally reviewed images from 1/8/2024 showing subcentimeter right lower lobe spongiform nodule with normal background echotexture thyroid gland and slightly enlarged left lower parathyroid gland  - Prior FNA's: None  - Denies dysphagia, dysphonia, dyspnea  - Denies history of radiation to the head/neck  - No known family history of thyroid cancer  - No tobacco use      # Hyperparathyroidism   - Labs 12/4/2023 with albumin 4.5, calcium 9.1, creatinine 0.94, 25-OH Vitamin D 32.3 and PTH 93  - Patient with a prior history of vitamin D insufficiency, taking vitamin D3  - Reports 1-2 servings of calcium rich foods in diet   - Take Vitamin D3 and fish oil, no calcium supplement     # Elevated thyroid antibodies  - Labs done 12/4/2023 with TSH 2.85, free T3 3.1, Free T4 1.14  - I do not see any thyroid antibodies that were sent with referral labs and labs 2/22/2023 with negative TPO antibody    # Weight gain  -Reports bothersome weight gain despite doing adequate physical activity  -Reports occasional episodes of binge eating  -Has occasional constipation  -No history of pancreatitis or gallbladder dysfunction  -No personal or family history of medullary thyroid cancer or MEN II   -Previously did not tolerate Adipex    Review of Systems   Constitutional:  Positive for appetite change and unexpected weight change. Negative for activity change.   HENT:  Negative for trouble swallowing and voice change.    Eyes:  Negative for visual disturbance.   Respiratory:  Negative for shortness of breath.    Cardiovascular:  Negative for chest pain.   Gastrointestinal:  Positive for constipation.  Negative for abdominal pain.   Endocrine: Negative for cold intolerance.   Skin:  Negative for rash and wound.   Neurological:  Positive for dizziness.   Psychiatric/Behavioral:  Negative for agitation and confusion.         Physical Exam  Vitals reviewed.   Constitutional:       General: She is not in acute distress.     Appearance: She is obese.   HENT:      Head: Normocephalic.      Nose: Nose normal.   Eyes:      Conjunctiva/sclera: Conjunctivae normal.   Cardiovascular:      Rate and Rhythm: Normal rate.   Pulmonary:      Effort: Pulmonary effort is normal.   Abdominal:      Tenderness: There is no guarding.   Musculoskeletal:         General: No deformity.   Skin:     General: Skin is warm and dry.   Neurological:      General: No focal deficit present.      Mental Status: She is alert.   Psychiatric:         Mood and Affect: Mood normal.        Assessment and plan:     Diagnoses and all orders for this visit:    1. Thyroid nodule (Primary)  Assessment & Plan:  -Personally reviewed thyroid ultrasound with patient from 1/2024  -Patient with a subcentimeter spongiform nodule that has overall benign features and does not meet criteria for FNA with grossly normal-appearing background thyroid tissue and possible enlargement of left lower parathyroid gland  -Recommend repeating thyroid ultrasound 1/2025 or sooner if she develops new onset compressive symptoms or change in physical exam    Orders:  -     TSH; Future  -     T4, Free; Future  -     T3; Future    2. Hyperparathyroidism  Assessment & Plan:  -Patient with normocalcemic hyperparathyroidism in the setting of normal 25-OH Vitamin D level and no known kidney dysfunction  -Recommend getting adequate calcium intake in her diet and continuing vitamin D3 supplementation  -Check CMP, PTH and 25-OH Vitamin D with upcoming labs, further recommendations pending upcoming labs    Orders:  -     PTH, Intact; Future  -     Comprehensive Metabolic Panel; Future    3.  Vitamin D deficiency  Assessment & Plan:  -Continue vitamin D3 supplementation, repeat 25-OH Vitamin D with upcoming labs    Orders:  -     Vitamin D 25 Hydroxy; Future    4. Weight gain  Assessment & Plan:  -Patient needs to have an assessment by psychiatry to determine if she has binge eating disorder; if they do not think that this is the case then a GLP-1 agonist would be a reasonable choice in addition to her current lifestyle modifications  -She will notify me after her appointment with psychiatry         Return in about 4 months (around 9/17/2024).     Electronically signed by Kyle S Rosenstein, MD

## 2024-05-17 ENCOUNTER — OFFICE VISIT (OUTPATIENT)
Dept: ENDOCRINOLOGY | Facility: CLINIC | Age: 44
End: 2024-05-17
Payer: COMMERCIAL

## 2024-05-17 VITALS
HEART RATE: 83 BPM | DIASTOLIC BLOOD PRESSURE: 72 MMHG | WEIGHT: 208.5 LBS | HEIGHT: 64 IN | OXYGEN SATURATION: 98 % | BODY MASS INDEX: 35.59 KG/M2 | SYSTOLIC BLOOD PRESSURE: 108 MMHG

## 2024-05-17 DIAGNOSIS — E21.3 HYPERPARATHYROIDISM: ICD-10-CM

## 2024-05-17 DIAGNOSIS — R63.5 WEIGHT GAIN: ICD-10-CM

## 2024-05-17 DIAGNOSIS — E55.9 VITAMIN D DEFICIENCY: ICD-10-CM

## 2024-05-17 DIAGNOSIS — E04.1 THYROID NODULE: Primary | ICD-10-CM

## 2024-05-17 NOTE — ASSESSMENT & PLAN NOTE
-Patient with normocalcemic hyperparathyroidism in the setting of normal 25-OH Vitamin D level and no known kidney dysfunction  -Recommend getting adequate calcium intake in her diet and continuing vitamin D3 supplementation  -Check CMP, PTH and 25-OH Vitamin D with upcoming labs, further recommendations pending upcoming labs

## 2024-05-17 NOTE — ASSESSMENT & PLAN NOTE
-Patient needs to have an assessment by psychiatry to determine if she has binge eating disorder; if they do not think that this is the case then a GLP-1 agonist would be a reasonable choice in addition to her current lifestyle modifications  -She will notify me after her appointment with psychiatry

## 2024-05-17 NOTE — ASSESSMENT & PLAN NOTE
-Personally reviewed thyroid ultrasound with patient from 1/2024  -Patient with a subcentimeter spongiform nodule that has overall benign features and does not meet criteria for FNA with grossly normal-appearing background thyroid tissue and possible enlargement of left lower parathyroid gland  -Recommend repeating thyroid ultrasound 1/2025 or sooner if she develops new onset compressive symptoms or change in physical exam

## 2025-03-11 ENCOUNTER — TRANSCRIBE ORDERS (OUTPATIENT)
Dept: ADMINISTRATIVE | Facility: HOSPITAL | Age: 45
End: 2025-03-11
Payer: COMMERCIAL

## 2025-03-11 DIAGNOSIS — Z12.31 VISIT FOR SCREENING MAMMOGRAM: Primary | ICD-10-CM

## 2025-03-12 LAB
NCCN CRITERIA FLAG: NORMAL
TYRER CUZICK SCORE: 9.6

## 2025-03-17 ENCOUNTER — HOSPITAL ENCOUNTER (OUTPATIENT)
Facility: HOSPITAL | Age: 45
Discharge: HOME | End: 2025-03-17
Admitting: INTERNAL MEDICINE
Payer: COMMERCIAL

## 2025-03-17 ENCOUNTER — OFFICE VISIT (OUTPATIENT)
Age: 45
End: 2025-03-17
Payer: COMMERCIAL

## 2025-03-17 VITALS
BODY MASS INDEX: 38.07 KG/M2 | DIASTOLIC BLOOD PRESSURE: 80 MMHG | OXYGEN SATURATION: 97 % | HEIGHT: 64 IN | HEART RATE: 86 BPM | WEIGHT: 223 LBS | SYSTOLIC BLOOD PRESSURE: 114 MMHG | TEMPERATURE: 98 F

## 2025-03-17 DIAGNOSIS — R06.83 SNORING: ICD-10-CM

## 2025-03-17 DIAGNOSIS — G47.33 OSA (OBSTRUCTIVE SLEEP APNEA): Primary | ICD-10-CM

## 2025-03-17 DIAGNOSIS — Z12.31 VISIT FOR SCREENING MAMMOGRAM: ICD-10-CM

## 2025-03-17 PROCEDURE — 77063 BREAST TOMOSYNTHESIS BI: CPT

## 2025-03-17 PROCEDURE — 77067 SCR MAMMO BI INCL CAD: CPT

## 2025-03-17 PROCEDURE — 99214 OFFICE O/P EST MOD 30 MIN: CPT | Performed by: NURSE PRACTITIONER

## 2025-03-17 RX ORDER — ESCITALOPRAM OXALATE 10 MG/1
TABLET ORAL
COMMUNITY
Start: 2025-03-09

## 2025-03-17 RX ORDER — ERGOCALCIFEROL (VITAMIN D2) 10 MCG
400 TABLET ORAL DAILY
COMMUNITY

## 2025-03-17 NOTE — PROGRESS NOTES
[facility] Follow up    CHIEF COMPLAINT    snoring    HISTORY OF PRESENT ILLNESS    Odalys Alaniz is a 44 y.o.female here today for follow-up.  She was last seen in the office by me in August 2023.  She denies any respiratory illnesses in the last 6 months.    She states that she has been snoring and waking herself up at nighttime for over a year.  She does not feel well rested in the mornings.  She does have some daytime somnolence.  She has never had a sleep study performed.    She denies any chest pain, palpitations.  She denies any lower extremity edema or calf tenderness.    She denies any morning headaches.    She tries to avoid caffeine in the afternoons.    She denies any reflux symptoms.    She is a lifetime non-smoker.    [place] Sleepiness Scale is 12/24.    Patient Active Problem List   Diagnosis    Syncope    Chest pain    Palpitations    Paresthesia    Left leg weakness    Intractable migraine with aura without status migrainosus    Cervicalgia    Pineal gland cyst    Allergic rhinitis    Thyroid nodule    Hyperparathyroidism    Vitamin D deficiency    Weight gain    Snoring       No Known Allergies    Current Outpatient Medications:     albuterol sulfate  (90 Base) MCG/ACT inhaler, Inhale See Admin Instructions. Inhale 2 puffs by mouth every 4 to 6 hours as needed, Disp: , Rfl:     escitalopram (LEXAPRO) 10 MG tablet, , Disp: , Rfl:     levonorgestrel (MIRENA) 20 MCG/24HR IUD, 1 each by Intrauterine route 1 (One) Time., Disp: , Rfl:     promethazine (PHENERGAN) 25 MG tablet, Take 1 tablet by mouth Every 6 (Six) Hours As Needed for Nausea. for nausea, Disp: 20 tablet, Rfl: 2    Vitamin D, Cholecalciferol, (CHOLECALCIFEROL) 10 MCG (400 UNIT) tablet, Take 1 tablet by mouth Daily., Disp: , Rfl:     vitamin D (ERGOCALCIFEROL) 1.25 MG (97839 UT) capsule capsule, TAKE 1 CAPSULE BY MOUTH WEEKLY FOR 12 WEEKS (Patient not taking: Reported on 3/17/2025), Disp: , Rfl:   MEDICATION LIST AND  "ALLERGIES REVIEWED.    Social History     Tobacco Use    Smoking status: Never    Smokeless tobacco: Never   Vaping Use    Vaping status: Never Used   Substance Use Topics    Alcohol use: Yes     Alcohol/week: 2.0 standard drinks of alcohol     Types: 2 Glasses of wine per week     Comment: occas    Drug use: No       FAMILY AND SOCIAL HISTORY REVIEWED.    Review of Systems   Constitutional:  Positive for fatigue. Negative for activity change, appetite change, fever and unexpected weight change.   HENT:  Negative for congestion, postnasal drip, rhinorrhea, sinus pressure, sore throat and voice change.    Eyes:  Negative for visual disturbance.   Respiratory:  Negative for cough, chest tightness, shortness of breath and wheezing.    Cardiovascular:  Negative for chest pain, palpitations and leg swelling.   Gastrointestinal:  Negative for abdominal distention, abdominal pain, nausea and vomiting.   Endocrine: Negative for cold intolerance and heat intolerance.   Genitourinary:  Negative for difficulty urinating and urgency.   Musculoskeletal:  Negative for arthralgias, back pain and neck pain.   Skin:  Negative for color change and pallor.   Allergic/Immunologic: Negative for environmental allergies and food allergies.   Neurological:  Negative for dizziness, syncope, weakness and light-headedness.   Hematological:  Negative for adenopathy. Does not bruise/bleed easily.   Psychiatric/Behavioral:  Positive for sleep disturbance. Negative for agitation and behavioral problems.    .    /80   Pulse 86   Temp 98 °F (36.7 °C)   Ht 162.6 cm (64.02\")   Wt 101 kg (223 lb)   SpO2 97% Comment: Room air at rest  BMI 38.25 kg/m²     Immunization History   Administered Date(s) Administered    COVID-19 (PFIZER) Purple Cap Monovalent 04/06/2021, 04/30/2021, 11/12/2021    Fluzone (or Fluarix & Flulaval for VFC) >6mos 10/19/2020       Physical Exam  Vitals and nursing note reviewed.   Constitutional:       Appearance: She " is well-developed. She is not diaphoretic.   HENT:      Head: Normocephalic and atraumatic.   Eyes:      Pupils: Pupils are equal, round, and reactive to light.   Neck:      Thyroid: No thyromegaly.   Cardiovascular:      Rate and Rhythm: Normal rate and regular rhythm.      Heart sounds: Normal heart sounds. No murmur heard.     No friction rub. No gallop.   Pulmonary:      Effort: Pulmonary effort is normal. No respiratory distress.      Breath sounds: Normal breath sounds. No wheezing or rales.   Chest:      Chest wall: No tenderness.   Abdominal:      General: Bowel sounds are normal.      Palpations: Abdomen is soft.      Tenderness: There is no abdominal tenderness.   Musculoskeletal:         General: No swelling. Normal range of motion.      Cervical back: Normal range of motion and neck supple.   Lymphadenopathy:      Cervical: No cervical adenopathy.   Skin:     General: Skin is warm and dry.      Capillary Refill: Capillary refill takes less than 2 seconds.   Neurological:      Mental Status: She is alert and oriented to person, place, and time.   Psychiatric:         Mood and Affect: Mood normal.         Behavior: Behavior normal.           RESULTS    PROBLEM LIST    Problem List Items Addressed This Visit          Sleep    Snoring     Other Visit Diagnoses         HANS (obstructive sleep apnea)    -  Primary    Relevant Orders    Home Sleep Study              DISCUSSION    Ms. Alaniz was here for initial evaluation for snoring and fatigue.    She does have daytime somnolence, nonrestorative sleep and loud snoring.  Unguinal order her home sleep study.  We will review those results once I receive the.    We did discuss treatment of HANS in the office today including CPAP therapy, using an oral appliance, surgical repair or the inspire device.    We did discuss exercise and weight loss to help with her sleeping.    We also discussed good sleep regimen such as laying in the lateral position, avoiding  alcohol  or sedatives prior to bedtime, regular exercise and weight loss.  We also discussed avoiding caffeine in the afternoons.    We will schedule her follow-up in 3 months and I will contact her after her home sleep study has been performed.    I personally spent a total of 31 minutes on patient visit today including chart review, face to face with the patient obtaining the history and physical exam, review of pertinent images and tests, counseling and discussion and/or coordination of care as described above, and documentation.  Total time excludes time spent on other separate services such as performing procedures or test interpretation, if applicable.        Karly Hernandez, JG  03/17/202514:13 EDT  Electronically signed     Please note that portions of this note were completed with a voice recognition program.        CC: Leigha Lopes MD

## 2025-04-01 ENCOUNTER — TRANSCRIBE ORDERS (OUTPATIENT)
Dept: ADMINISTRATIVE | Facility: HOSPITAL | Age: 45
End: 2025-04-01
Payer: COMMERCIAL

## 2025-04-01 DIAGNOSIS — R42 DIZZINESS: Primary | ICD-10-CM

## 2025-04-23 ENCOUNTER — HOSPITAL ENCOUNTER (OUTPATIENT)
Dept: CARDIOLOGY | Facility: HOSPITAL | Age: 45
Discharge: HOME | End: 2025-04-23
Payer: COMMERCIAL

## 2025-04-23 VITALS — HEIGHT: 64 IN | WEIGHT: 223 LBS | BODY MASS INDEX: 38.07 KG/M2

## 2025-04-23 DIAGNOSIS — R42 DIZZINESS: ICD-10-CM

## 2025-04-23 LAB — GLUCOSE BLDC GLUCOMTR-MCNC: 120 MG/DL (ref 70–130)

## 2025-04-23 PROCEDURE — 93660 TILT TABLE EVALUATION: CPT

## 2025-04-23 PROCEDURE — 82948 REAGENT STRIP/BLOOD GLUCOSE: CPT

## 2025-04-23 NOTE — SIGNIFICANT NOTE
"Code Stroke called at 14:15  I responded to the bedside in [facility].  Patient reportedly had sudden onset of generalized weakness, dizziness, slurred speech and bilateral hand numbness.  She feels she \"passed out\" staff at the bedside note that she did not fully loose consciousness but appeared close.  Symptoms quickly resolved and patient speaking normally by the time I arrived.  SBP 100s during the occurrence.  Her only symptom at this point with hand tingling bilaterally and tremor which also quickly resolved.      NIH Stroke Scale  Time: 14:20 EDT  Person Administering Scale: JG Negrete    1a  Level of consciousness: 0=alert; keenly responsive   1b. LOC questions:  0=Answers both questions correctly   1c. LOC commands: 0=Performs both tasks correctly   2.  Best Gaze: 0=normal   3.  Visual: 0=No visual loss   4. Facial Palsy: 0=Normal symmetric movement   5a.  Motor left arm: 0=No drift, limb holds 90 (or 45) degrees for full 10 seconds   5b.  Motor right arm: 0=No drift, limb holds 90 (or 45) degrees for full 10 seconds   6a. motor left le=No drift, limb holds 90 (or 45) degrees for full 10 seconds   6b  Motor right le=No drift, limb holds 90 (or 45) degrees for full 10 seconds   7. Limb Ataxia: 0=Absent   8.  Sensory: 0=Normal; no sensory loss   9. Best Language:  0=No aphasia, normal   10. Dysarthria: 0=Normal   11. Extinction and Inattention: 0=No abnormality    Total:   0     Suspect syncopal event which occurred while she was here for a tilt table test  Non focal exam with NIHS 0  Code stroke canceled, no further imaging or stroke workup is needed    JG Handy  Stroke neurology  "

## 2025-05-05 ENCOUNTER — HOSPITAL ENCOUNTER (OUTPATIENT)
Dept: SLEEP MEDICINE | Facility: HOSPITAL | Age: 45
Discharge: HOME | End: 2025-05-05
Admitting: NURSE PRACTITIONER
Payer: COMMERCIAL

## 2025-05-05 VITALS — BODY MASS INDEX: 38.01 KG/M2 | HEIGHT: 64 IN | WEIGHT: 222.66 LBS

## 2025-05-05 DIAGNOSIS — G47.33 OSA (OBSTRUCTIVE SLEEP APNEA): ICD-10-CM

## 2025-05-05 PROCEDURE — 95800 SLP STDY UNATTENDED: CPT

## 2025-05-13 ENCOUNTER — RESULTS FOLLOW-UP (OUTPATIENT)
Age: 45
End: 2025-05-13
Payer: COMMERCIAL

## 2025-05-13 NOTE — TELEPHONE ENCOUNTER
Called pt and informed her of results. Pt states she's satisfied with these results and has no other concerns that she would like to follow up on. Pt would like to cancel follow up if that is okay with you. Please advise.

## 2025-05-13 NOTE — TELEPHONE ENCOUNTER
----- Message from Karly Hernandez sent at 5/13/2025  9:34 AM EDT -----  Please let her know that her sleep study was negative.  If she would like to have an in-lab sleep study I can order that for a more thorough study.  Up to her.  If she is agreeable I will order it.    Thanks  ----- Message -----  From: Arnoldo Fernandes MD  Sent: 5/12/2025   5:20 PM EDT  To: JG Malave

## 2025-05-29 ENCOUNTER — OFFICE VISIT (OUTPATIENT)
Dept: CARDIOLOGY | Facility: CLINIC | Age: 45
End: 2025-05-29
Payer: COMMERCIAL

## 2025-05-29 VITALS
HEART RATE: 75 BPM | WEIGHT: 214.9 LBS | OXYGEN SATURATION: 98 % | HEIGHT: 64 IN | BODY MASS INDEX: 36.69 KG/M2 | SYSTOLIC BLOOD PRESSURE: 116 MMHG | DIASTOLIC BLOOD PRESSURE: 78 MMHG

## 2025-05-29 DIAGNOSIS — G90.9 AUTONOMIC DYSFUNCTION: Primary | ICD-10-CM

## 2025-05-29 RX ORDER — MELOXICAM 15 MG/1
1 TABLET ORAL DAILY
COMMUNITY
Start: 2025-05-07

## 2025-05-29 RX ORDER — PERFLUOROHEXYLOCTANE 1 MG/MG
SOLUTION OPHTHALMIC DAILY
COMMUNITY
Start: 2025-05-27

## 2025-05-29 RX ORDER — TIRZEPATIDE 5 MG/.5ML
5 INJECTION, SOLUTION SUBCUTANEOUS WEEKLY
COMMUNITY
Start: 2025-05-19

## 2025-05-29 RX ORDER — LATANOPROST 50 UG/ML
1 SOLUTION/ DROPS OPHTHALMIC NIGHTLY
COMMUNITY
Start: 2025-05-27

## 2025-05-29 NOTE — PROGRESS NOTES
"Chief Complaint  Syncope Cardiogenic and Establish Care (New Patient)      Subjective   History of Present Illness    Problem List  -Autonomic dysfunction, positive tilt table.   -BMI 36, on GLP-1 agonist  -echo normal in 2018  -EKG normal      Ms. Alaniz is a 44 year old woman being seen for the above.  When does maximal exertion feels light headed then stops and then passes out.  Also when hot similar story.  Recently started on GLP-1 agonist.  Orthostatic BP normal in office today.  ROS negative except for the above.         Objective   Vital Signs:  Vitals:    05/29/25 0916   BP: 116/78   Pulse: 75   SpO2: 98%     Estimated body mass index is 36.87 kg/m² as calculated from the following:    Height as of this encounter: 162.6 cm (64.02\").    Weight as of this encounter: 97.5 kg (214 lb 14.4 oz).       Physical Exam  HENT:      Head: Normocephalic.   Eyes:      Extraocular Movements: Extraocular movements intact.   Cardiovascular:      Rate and Rhythm: Normal rate and regular rhythm.      Heart sounds: No murmur heard.     No gallop.   Pulmonary:      Breath sounds: Normal breath sounds.   Abdominal:      Palpations: Abdomen is soft.   Musculoskeletal:      Right lower leg: No edema.      Left lower leg: No edema.   Skin:     General: Skin is warm and dry.   Neurological:      General: No focal deficit present.      Mental Status: She is alert.   Psychiatric:         Mood and Affect: Mood normal.           ECG 12 Lead    Date/Time: 5/29/2025 10:04 AM  Performed by: Jesus Waggoner MD    Authorized by: Jesus Waggoner MD  Rhythm: sinus rhythm    Clinical impression: normal ECG           Assessment   -Autonomic dysfunction, positive tilt table.   -BMI 36, on GLP-1 agonist  -echo normal in 2018  -EKG normal    Plan   -When her heart rate becomes elevated then it goes low and she becomes hypotensive.  Findings similar on tilt table.  Discussed conservative measures.  Discussed ways GLP-1 agonist may " make this worse and help combat them.      Return in about 3 months (around 8/29/2025).  Jesus Waggoner MD  05/29/2025 10:04 EDT